# Patient Record
Sex: FEMALE | Race: WHITE | Employment: UNEMPLOYED | URBAN - METROPOLITAN AREA
[De-identification: names, ages, dates, MRNs, and addresses within clinical notes are randomized per-mention and may not be internally consistent; named-entity substitution may affect disease eponyms.]

---

## 2017-06-06 ENCOUNTER — OFFICE VISIT (OUTPATIENT)
Dept: INTERNAL MEDICINE | Age: 62
End: 2017-06-06

## 2017-06-06 VITALS
HEART RATE: 66 BPM | OXYGEN SATURATION: 98 % | DIASTOLIC BLOOD PRESSURE: 70 MMHG | BODY MASS INDEX: 27 KG/M2 | WEIGHT: 168 LBS | HEIGHT: 66 IN | SYSTOLIC BLOOD PRESSURE: 124 MMHG

## 2017-06-06 DIAGNOSIS — E55.9 VITAMIN D DEFICIENCY: Chronic | ICD-10-CM

## 2017-06-06 DIAGNOSIS — E78.5 HYPERLIPIDEMIA, UNSPECIFIED HYPERLIPIDEMIA TYPE: Chronic | ICD-10-CM

## 2017-06-06 DIAGNOSIS — K21.9 GASTROESOPHAGEAL REFLUX DISEASE WITHOUT ESOPHAGITIS: Chronic | ICD-10-CM

## 2017-06-06 DIAGNOSIS — I10 ESSENTIAL HYPERTENSION: Primary | Chronic | ICD-10-CM

## 2017-06-06 DIAGNOSIS — F33.42 RECURRENT MAJOR DEPRESSIVE DISORDER, IN FULL REMISSION (HCC): Chronic | ICD-10-CM

## 2017-06-06 DIAGNOSIS — E03.9 HYPOTHYROIDISM, UNSPECIFIED TYPE: Chronic | ICD-10-CM

## 2017-06-06 DIAGNOSIS — F41.9 ANXIETY: Chronic | ICD-10-CM

## 2017-06-06 PROBLEM — N83.202 LEFT OVARIAN CYST: Chronic | Status: ACTIVE | Noted: 2017-06-06

## 2017-06-06 PROBLEM — N83.202 LEFT OVARIAN CYST: Status: ACTIVE | Noted: 2017-06-06

## 2017-06-06 PROCEDURE — G8427 DOCREV CUR MEDS BY ELIG CLIN: HCPCS | Performed by: INTERNAL MEDICINE

## 2017-06-06 PROCEDURE — 3014F SCREEN MAMMO DOC REV: CPT | Performed by: INTERNAL MEDICINE

## 2017-06-06 PROCEDURE — 99204 OFFICE O/P NEW MOD 45 MIN: CPT | Performed by: INTERNAL MEDICINE

## 2017-06-06 PROCEDURE — 3017F COLORECTAL CA SCREEN DOC REV: CPT | Performed by: INTERNAL MEDICINE

## 2017-06-06 PROCEDURE — 1036F TOBACCO NON-USER: CPT | Performed by: INTERNAL MEDICINE

## 2017-06-06 PROCEDURE — G8419 CALC BMI OUT NRM PARAM NOF/U: HCPCS | Performed by: INTERNAL MEDICINE

## 2017-06-06 RX ORDER — LEVOTHYROXINE SODIUM 0.15 MG/1
150 TABLET ORAL DAILY
COMMUNITY
Start: 2017-06-06 | End: 2017-06-06 | Stop reason: SDUPTHER

## 2017-06-06 RX ORDER — METOPROLOL TARTRATE 50 MG/1
50 TABLET, FILM COATED ORAL 2 TIMES DAILY
COMMUNITY
Start: 2017-06-06 | End: 2017-06-06 | Stop reason: SDUPTHER

## 2017-06-06 RX ORDER — LEVOTHYROXINE SODIUM 0.15 MG/1
150 TABLET ORAL DAILY
Qty: 30 TABLET | Refills: 12 | Status: SHIPPED | OUTPATIENT
Start: 2017-06-06 | End: 2017-09-06 | Stop reason: DRUGHIGH

## 2017-06-06 RX ORDER — SERTRALINE HYDROCHLORIDE 100 MG/1
100 TABLET, FILM COATED ORAL DAILY
COMMUNITY
Start: 2017-06-06 | End: 2017-06-06 | Stop reason: SDUPTHER

## 2017-06-06 RX ORDER — CLONAZEPAM 0.5 MG/1
0.5 TABLET ORAL 3 TIMES DAILY
Qty: 90 TABLET | Refills: 2 | Status: SHIPPED | OUTPATIENT
Start: 2017-06-06 | End: 2017-08-29 | Stop reason: SDUPTHER

## 2017-06-06 RX ORDER — METOPROLOL TARTRATE 50 MG/1
50 TABLET, FILM COATED ORAL 2 TIMES DAILY
Qty: 180 TABLET | Refills: 3 | Status: SHIPPED | OUTPATIENT
Start: 2017-06-06

## 2017-06-06 RX ORDER — CLONAZEPAM 0.5 MG/1
0.5 TABLET ORAL 3 TIMES DAILY
COMMUNITY
Start: 2017-06-06 | End: 2017-06-06 | Stop reason: SDUPTHER

## 2017-06-06 RX ORDER — SERTRALINE HYDROCHLORIDE 100 MG/1
100 TABLET, FILM COATED ORAL DAILY
Qty: 90 TABLET | Refills: 3 | Status: SHIPPED | OUTPATIENT
Start: 2017-06-06 | End: 2017-07-19 | Stop reason: SDUPTHER

## 2017-06-06 ASSESSMENT — ENCOUNTER SYMPTOMS
RESPIRATORY NEGATIVE: 1
EYES NEGATIVE: 1
GASTROINTESTINAL NEGATIVE: 1

## 2017-06-07 DIAGNOSIS — F41.9 ANXIETY: Chronic | ICD-10-CM

## 2017-06-07 LAB
ALBUMIN SERPL-MCNC: 4.5 G/DL (ref 3.4–5)
ALP BLD-CCNC: 115 U/L (ref 40–129)
ALT SERPL-CCNC: 12 U/L (ref 10–40)
ANION GAP SERPL CALCULATED.3IONS-SCNC: 16 MMOL/L (ref 3–16)
AST SERPL-CCNC: 12 U/L (ref 15–37)
BASOPHILS ABSOLUTE: 0.1 K/UL (ref 0–0.2)
BASOPHILS RELATIVE PERCENT: 1.1 %
BILIRUB SERPL-MCNC: <0.2 MG/DL (ref 0–1)
BILIRUBIN DIRECT: <0.2 MG/DL (ref 0–0.3)
BILIRUBIN URINE: NEGATIVE
BILIRUBIN, INDIRECT: ABNORMAL MG/DL (ref 0–1)
BLOOD, URINE: NEGATIVE
BUN BLDV-MCNC: 15 MG/DL (ref 7–20)
CALCIUM SERPL-MCNC: 9.6 MG/DL (ref 8.3–10.6)
CASTS: ABNORMAL /LPF
CHLORIDE BLD-SCNC: 101 MMOL/L (ref 99–110)
CHOLESTEROL, TOTAL: 237 MG/DL (ref 0–199)
CLARITY: CLEAR
CO2: 26 MMOL/L (ref 21–32)
COLOR: YELLOW
CREAT SERPL-MCNC: 0.7 MG/DL (ref 0.6–1.2)
EOSINOPHILS ABSOLUTE: 0.3 K/UL (ref 0–0.6)
EOSINOPHILS RELATIVE PERCENT: 3.7 %
EPITHELIAL CELLS, UA: 4 /HPF (ref 0–5)
GFR AFRICAN AMERICAN: >60
GFR NON-AFRICAN AMERICAN: >60
GLUCOSE BLD-MCNC: 90 MG/DL (ref 70–99)
GLUCOSE URINE: NEGATIVE MG/DL
HCT VFR BLD CALC: 47.2 % (ref 36–48)
HDLC SERPL-MCNC: 25 MG/DL (ref 40–60)
HEMOGLOBIN: 15.2 G/DL (ref 12–16)
KETONES, URINE: NEGATIVE MG/DL
LDL CHOLESTEROL CALCULATED: ABNORMAL MG/DL
LDL CHOLESTEROL DIRECT: 159 MG/DL
LEUKOCYTE ESTERASE, URINE: ABNORMAL
LYMPHOCYTES ABSOLUTE: 2.5 K/UL (ref 1–5.1)
LYMPHOCYTES RELATIVE PERCENT: 35.5 %
MCH RBC QN AUTO: 28.7 PG (ref 26–34)
MCHC RBC AUTO-ENTMCNC: 32.2 G/DL (ref 31–36)
MCV RBC AUTO: 89 FL (ref 80–100)
MICROSCOPIC EXAMINATION: YES
MONOCYTES ABSOLUTE: 0.4 K/UL (ref 0–1.3)
MONOCYTES RELATIVE PERCENT: 6.4 %
NEUTROPHILS ABSOLUTE: 3.7 K/UL (ref 1.7–7.7)
NEUTROPHILS RELATIVE PERCENT: 53.3 %
NITRITE, URINE: NEGATIVE
PDW BLD-RTO: 13.4 % (ref 12.4–15.4)
PH UA: 6
PHOSPHORUS: 3.8 MG/DL (ref 2.5–4.9)
PLATELET # BLD: 312 K/UL (ref 135–450)
PMV BLD AUTO: 8.8 FL (ref 5–10.5)
POTASSIUM SERPL-SCNC: 4.9 MMOL/L (ref 3.5–5.1)
PROTEIN UA: NEGATIVE MG/DL
RBC # BLD: 5.31 M/UL (ref 4–5.2)
RBC UA: 4 /HPF (ref 0–4)
SODIUM BLD-SCNC: 143 MMOL/L (ref 136–145)
SPECIFIC GRAVITY UA: 1.02
TOTAL PROTEIN: 6.8 G/DL (ref 6.4–8.2)
TRIGL SERPL-MCNC: 341 MG/DL (ref 0–150)
TSH SERPL DL<=0.05 MIU/L-ACNC: 0.06 UIU/ML (ref 0.27–4.2)
URINE TYPE: ABNORMAL
UROBILINOGEN, URINE: 0.2 E.U./DL
VITAMIN D 25-HYDROXY: 19.9 NG/ML
VLDLC SERPL CALC-MCNC: ABNORMAL MG/DL
WBC # BLD: 7 K/UL (ref 4–11)
WBC UA: 1 /HPF (ref 0–5)

## 2017-06-11 LAB
6-ACETYLMORPHINE: NOT DETECTED
7-AMINOCLONAZEPAM: PRESENT
ALPHA-OH-ALPRAZOLAM: NOT DETECTED
ALPRAZOLAM: NOT DETECTED
AMPHETAMINE: NOT DETECTED
BARBITURATES: NOT DETECTED
BENZOYLECGONINE: NOT DETECTED
BUPRENORPHINE: NOT DETECTED
CARISOPRODOL: NOT DETECTED
CLONAZEPAM: NOT DETECTED
CODEINE: NOT DETECTED
CREATININE URINE: 87.1 MG/DL (ref 20–400)
DIAZEPAM: NOT DETECTED
DRUGS EXPECTED: NORMAL
EER PAIN MGT DRUG PANEL, HIGH RES/EMIT U: NORMAL
ETHYL GLUCURONIDE: NOT DETECTED
FENTANYL: NOT DETECTED
HYDROCODONE: NOT DETECTED
HYDROMORPHONE: NOT DETECTED
LORAZEPAM: NOT DETECTED
MARIJUANA METABOLITE: PRESENT
MDA: NOT DETECTED
MDEA: NOT DETECTED
MDMA URINE: NOT DETECTED
MEPERIDINE: NOT DETECTED
METHADONE: NOT DETECTED
METHAMPHETAMINE: NOT DETECTED
METHYLPHENIDATE: NOT DETECTED
MIDAZOLAM: NOT DETECTED
MORPHINE: NOT DETECTED
NORBUPRENORPHINE, FREE: NOT DETECTED
NORDIAZEPAM: NOT DETECTED
NORFENTANYL: NOT DETECTED
NORHYDROCODONE, URINE: NOT DETECTED
NOROXYCODONE: NOT DETECTED
NOROXYMORPHONE, URINE: NOT DETECTED
OXAZEPAM: NOT DETECTED
OXYCODONE: NOT DETECTED
OXYMORPHONE: NOT DETECTED
PAIN MANAGEMENT DRUG PANEL: NORMAL
PAIN MANAGEMENT DRUG PANEL: NORMAL
PCP: NOT DETECTED
PHENTERMINE: NOT DETECTED
PROPOXYPHENE: NOT DETECTED
TAPENTADOL, URINE: NOT DETECTED
TAPENTADOL-O-SULFATE, URINE: NOT DETECTED
TEMAZEPAM: NOT DETECTED
TRAMADOL: NOT DETECTED
ZOLPIDEM: NOT DETECTED

## 2017-06-11 ASSESSMENT — ENCOUNTER SYMPTOMS
BLURRED VISION: 0
ORTHOPNEA: 0
SHORTNESS OF BREATH: 0

## 2017-07-18 ENCOUNTER — TELEPHONE (OUTPATIENT)
Dept: INTERNAL MEDICINE | Age: 62
End: 2017-07-18

## 2017-07-18 DIAGNOSIS — F33.42 RECURRENT MAJOR DEPRESSIVE DISORDER, IN FULL REMISSION (HCC): Chronic | ICD-10-CM

## 2017-07-19 RX ORDER — SERTRALINE HYDROCHLORIDE 100 MG/1
TABLET, FILM COATED ORAL
Qty: 135 TABLET | Refills: 3 | Status: SHIPPED | OUTPATIENT
Start: 2017-07-19 | End: 2017-09-06 | Stop reason: SDUPTHER

## 2017-08-29 DIAGNOSIS — F41.9 ANXIETY: Chronic | ICD-10-CM

## 2017-08-30 ENCOUNTER — TELEPHONE (OUTPATIENT)
Dept: INTERNAL MEDICINE | Age: 62
End: 2017-08-30

## 2017-08-30 RX ORDER — CLONAZEPAM 0.5 MG/1
TABLET ORAL
Qty: 90 TABLET | Refills: 2 | Status: SHIPPED | OUTPATIENT
Start: 2017-08-30 | End: 2017-09-06 | Stop reason: DRUGHIGH

## 2017-09-06 ENCOUNTER — OFFICE VISIT (OUTPATIENT)
Dept: INTERNAL MEDICINE | Age: 62
End: 2017-09-06

## 2017-09-06 VITALS
OXYGEN SATURATION: 98 % | SYSTOLIC BLOOD PRESSURE: 120 MMHG | WEIGHT: 164 LBS | DIASTOLIC BLOOD PRESSURE: 80 MMHG | HEIGHT: 66 IN | HEART RATE: 55 BPM | BODY MASS INDEX: 26.36 KG/M2

## 2017-09-06 DIAGNOSIS — Z12.12 SCREENING FOR COLORECTAL CANCER: ICD-10-CM

## 2017-09-06 DIAGNOSIS — Z12.11 SCREENING FOR COLORECTAL CANCER: ICD-10-CM

## 2017-09-06 DIAGNOSIS — Z23 NEED FOR TDAP VACCINATION: ICD-10-CM

## 2017-09-06 DIAGNOSIS — F41.9 ANXIETY: Chronic | ICD-10-CM

## 2017-09-06 DIAGNOSIS — E55.9 VITAMIN D DEFICIENCY: Chronic | ICD-10-CM

## 2017-09-06 DIAGNOSIS — F33.42 RECURRENT MAJOR DEPRESSIVE DISORDER, IN FULL REMISSION (HCC): Primary | Chronic | ICD-10-CM

## 2017-09-06 DIAGNOSIS — E03.9 HYPOTHYROIDISM, UNSPECIFIED TYPE: Chronic | ICD-10-CM

## 2017-09-06 DIAGNOSIS — E78.5 HYPERLIPIDEMIA, UNSPECIFIED HYPERLIPIDEMIA TYPE: Chronic | ICD-10-CM

## 2017-09-06 DIAGNOSIS — I10 ESSENTIAL HYPERTENSION: Chronic | ICD-10-CM

## 2017-09-06 PROCEDURE — 99214 OFFICE O/P EST MOD 30 MIN: CPT | Performed by: INTERNAL MEDICINE

## 2017-09-06 PROCEDURE — 3014F SCREEN MAMMO DOC REV: CPT | Performed by: INTERNAL MEDICINE

## 2017-09-06 PROCEDURE — 1036F TOBACCO NON-USER: CPT | Performed by: INTERNAL MEDICINE

## 2017-09-06 PROCEDURE — G8417 CALC BMI ABV UP PARAM F/U: HCPCS | Performed by: INTERNAL MEDICINE

## 2017-09-06 PROCEDURE — 90715 TDAP VACCINE 7 YRS/> IM: CPT | Performed by: INTERNAL MEDICINE

## 2017-09-06 PROCEDURE — 90471 IMMUNIZATION ADMIN: CPT | Performed by: INTERNAL MEDICINE

## 2017-09-06 PROCEDURE — G8427 DOCREV CUR MEDS BY ELIG CLIN: HCPCS | Performed by: INTERNAL MEDICINE

## 2017-09-06 PROCEDURE — 3017F COLORECTAL CA SCREEN DOC REV: CPT | Performed by: INTERNAL MEDICINE

## 2017-09-06 RX ORDER — CLONAZEPAM 1 MG/1
1 TABLET ORAL 2 TIMES DAILY
Qty: 60 TABLET | Refills: 2 | Status: ON HOLD | OUTPATIENT
Start: 2017-09-06 | End: 2021-12-29 | Stop reason: SDUPTHER

## 2017-09-06 RX ORDER — LEVOTHYROXINE SODIUM 137 UG/1
137 TABLET ORAL DAILY
Qty: 90 TABLET | Refills: 3 | Status: SHIPPED | OUTPATIENT
Start: 2017-09-06

## 2017-09-06 RX ORDER — MULTIVIT-MIN/IRON/FOLIC ACID/K 18-600-40
1 CAPSULE ORAL DAILY
Qty: 90 CAPSULE | Refills: 3 | Status: SHIPPED | OUTPATIENT
Start: 2017-09-06

## 2017-09-06 RX ORDER — SIMVASTATIN 40 MG
40 TABLET ORAL NIGHTLY
Qty: 90 TABLET | Refills: 3 | Status: ON HOLD | OUTPATIENT
Start: 2017-09-06 | End: 2021-12-22 | Stop reason: CLARIF

## 2017-09-06 RX ORDER — SERTRALINE HYDROCHLORIDE 100 MG/1
TABLET, FILM COATED ORAL
Qty: 135 TABLET | Refills: 3 | Status: ON HOLD | OUTPATIENT
Start: 2017-09-06 | End: 2021-12-22 | Stop reason: CLARIF

## 2017-09-06 ASSESSMENT — ENCOUNTER SYMPTOMS
EYES NEGATIVE: 1
BLURRED VISION: 0
ORTHOPNEA: 0
GASTROINTESTINAL NEGATIVE: 1
RESPIRATORY NEGATIVE: 1
SHORTNESS OF BREATH: 0

## 2017-09-06 ASSESSMENT — PATIENT HEALTH QUESTIONNAIRE - PHQ9
SUM OF ALL RESPONSES TO PHQ QUESTIONS 1-9: 0
SUM OF ALL RESPONSES TO PHQ9 QUESTIONS 1 & 2: 0
1. LITTLE INTEREST OR PLEASURE IN DOING THINGS: 0
2. FEELING DOWN, DEPRESSED OR HOPELESS: 0

## 2017-12-07 ENCOUNTER — OFFICE VISIT (OUTPATIENT)
Dept: INTERNAL MEDICINE CLINIC | Age: 62
End: 2017-12-07

## 2017-12-07 VITALS
HEART RATE: 80 BPM | HEIGHT: 66 IN | WEIGHT: 170 LBS | DIASTOLIC BLOOD PRESSURE: 94 MMHG | SYSTOLIC BLOOD PRESSURE: 142 MMHG | BODY MASS INDEX: 27.32 KG/M2 | RESPIRATION RATE: 16 BRPM

## 2017-12-07 DIAGNOSIS — F32.A ANXIETY AND DEPRESSION: Primary | ICD-10-CM

## 2017-12-07 DIAGNOSIS — F41.9 ANXIETY AND DEPRESSION: Primary | ICD-10-CM

## 2017-12-07 PROCEDURE — 3017F COLORECTAL CA SCREEN DOC REV: CPT | Performed by: INTERNAL MEDICINE

## 2017-12-07 PROCEDURE — 1036F TOBACCO NON-USER: CPT | Performed by: INTERNAL MEDICINE

## 2017-12-07 PROCEDURE — G8427 DOCREV CUR MEDS BY ELIG CLIN: HCPCS | Performed by: INTERNAL MEDICINE

## 2017-12-07 PROCEDURE — 3014F SCREEN MAMMO DOC REV: CPT | Performed by: INTERNAL MEDICINE

## 2017-12-07 PROCEDURE — G8484 FLU IMMUNIZE NO ADMIN: HCPCS | Performed by: INTERNAL MEDICINE

## 2017-12-07 PROCEDURE — G8417 CALC BMI ABV UP PARAM F/U: HCPCS | Performed by: INTERNAL MEDICINE

## 2017-12-07 PROCEDURE — 99212 OFFICE O/P EST SF 10 MIN: CPT | Performed by: INTERNAL MEDICINE

## 2017-12-07 NOTE — PROGRESS NOTES
Cb Duncan  YOB: 1955    Date of Service:  12/7/2017    Chief Complaint:   Cb Duncan is a 65 y.o. female who presents for   Chief Complaint   Patient presents with   Guillermina Stafford       HPI: Here to [facility] and Follow up. Pt arrived after her appointment and when I saw her and try to talk to her about her psychiatric medication and to discuss with her getting another opinion regarding her klonopin or weaning it down a little, she decided she wanted to see a different PCP and end this visit. When asked about her Bipolar dx on file, she denies having bipolar. She's seen a psychiatrist a long time ago and does not want to see one again.       Lab Results   Component Value Date    LABMICR YES 06/07/2017     Lab Results   Component Value Date     06/07/2017    K 4.9 06/07/2017     06/07/2017    CO2 26 06/07/2017    BUN 15 06/07/2017    CREATININE 0.7 06/07/2017    GLUCOSE 90 06/07/2017    CALCIUM 9.6 06/07/2017     Lab Results   Component Value Date    CHOL 237 06/07/2017    TRIG 341 06/07/2017    HDL 25 06/07/2017    HDL 35 05/04/2011    LDLCALC see below 06/07/2017    LDLDIRECT 159 06/07/2017     Lab Results   Component Value Date    ALT 12 06/07/2017    AST 12 (L) 06/07/2017     Lab Results   Component Value Date    TSH 0.06 (L) 06/07/2017    TSH 5.25 05/04/2011     Lab Results   Component Value Date    WBC 7.0 06/07/2017    HGB 15.2 06/07/2017    HCT 47.2 06/07/2017    MCV 89.0 06/07/2017     06/07/2017     No results found for: INR   No results found for: PSA   No results found for: LABURIC     Wt Readings from Last 3 Encounters:   12/07/17 170 lb (77.1 kg)   09/06/17 164 lb (74.4 kg)   06/06/17 168 lb (76.2 kg)     BP Readings from Last 3 Encounters:   12/07/17 (!) 142/94   09/06/17 120/80   06/06/17 124/70       Patient Active Problem List   Diagnosis    Hyperlipidemia    Vitamin D deficiency    Bipolar affective disorder (HCC)    Anxiety  Hypothyroidism    Essential hypertension    Gastroesophageal reflux disease without esophagitis    Recurrent major depressive disorder, in full remission ([address]    Left ovarian cyst       No Known Allergies  Outpatient Prescriptions Marked as Taking for the 12/7/17 encounter (Office Visit) with Ariana Gates MD   Medication Sig Dispense Refill    sertraline (ZOLOFT) 100 MG tablet Take 1.5 tablets by mouth daily 135 tablet 3    simvastatin (ZOCOR) 40 MG tablet Take 1 tablet by mouth nightly 90 tablet 3    levothyroxine (SYNTHROID) 137 MCG tablet Take 1 tablet by mouth daily 90 tablet 3    Cholecalciferol (VITAMIN D) 2000 units CAPS capsule Take 1 capsule by mouth daily 90 capsule 3    clonazePAM (KLONOPIN) 1 MG tablet Take 1 tablet by mouth 2 times daily Do not fill until September 28, 2017 60 tablet 2    metoprolol tartrate (LOPRESSOR) 50 MG tablet Take 1 tablet by mouth 2 times daily 180 tablet 3       Past Medical History:   Diagnosis Date    Anxiety 6/2/2011    Bipolar affective disorder ([address] 6/23/2010    Diaphragmatic hernia without mention of obstruction or gangrene     Essential hypertension 6/6/2017    Gastroesophageal reflux disease without esophagitis 6/6/2017    Hyperlipidemia 5/26/2010    Hypothyroidism 6/6/2017    Left ovarian cyst 6/6/2017    Recurrent major depressive disorder, in full remission ([address] 6/6/2017    Vitamin D deficiency 5/26/2010     Past Surgical History:   Procedure Laterality Date    COLONOSCOPY  2006    HYSTERECTOMY  06/2000    Dr. Violet Robles - vaginal hysterectomy with anterior and posterior repair for a symptomatic second-degree uterovaginal prolapse complicated by a same-day post-operative intraperitoneal hemorrhage     Family History   Problem Relation Age of Onset    Schizophrenia Mother     Cancer Mother      lung cancer    Lung Cancer Mother     Substance Abuse Mother      smoker    Mental Illness Daughter     Emphysema Father     Substance Abuse Father      smoker    COPD Father     High Blood Pressure Father     Hypertension Father     High Blood Pressure Brother     Substance Abuse Brother      smoker    Other Brother      aortic disection (repaired)   [facility] Hypertension Brother     Thyroid Disease Sister      untreated hypothyroidism    Substance Abuse Sister      smoker    High Blood Pressure Brother     Hypertension Brother     Cancer Brother      basal cell carcinoma of the skin    Immunodeficiency Son      HIV     Mental Illness Daughter      Social History     Social History    Marital status:      Spouse name: N/A    Number of children: 3    Years of education: N/A     Occupational History    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      as of June 6, 2017    receiving disability since 1997 (she says that she was wrongfully discharged)      as of June 6, 2017     Social History Main Topics    Smoking status: Former Smoker    Smokeless tobacco: Never Used      Comment: started to smoke in 1992 - quit smoking in 1997 or 1998    Alcohol use No    Drug use: No    Sexual activity: Not Currently     Partners: Male     Other Topics Concern    Not on file     Social History Narrative    No narrative on file       Review of Systems:       Physical Exam:   Vitals:    12/07/17 1452   BP: (!) 142/94   Pulse: 80   Resp: 16   Weight: 170 lb (77.1 kg)   Height: 5' 6\" (1.676 m)     Body mass index is 27.44 kg/m². Assessment/Plan:    Mike Dove was seen today for established new doctor. Diagnoses and all orders for this visit:    Anxiety and depression    Other orders  -     Cancel: INFLUENZA, QUADV, 3 YRS AND OLDER, IM, PF, PREFILL SYR OR SDV, 0.5ML (FLUZONE QUADV, PF)        Return Establish with new PCP.

## 2017-12-08 ENCOUNTER — TELEPHONE (OUTPATIENT)
Dept: INTERNAL MEDICINE | Age: 62
End: 2017-12-08

## 2017-12-08 DIAGNOSIS — F41.9 ANXIETY: Chronic | ICD-10-CM

## 2017-12-08 RX ORDER — CLONAZEPAM 1 MG/1
1 TABLET ORAL 2 TIMES DAILY
Qty: 60 TABLET | Refills: 0 | Status: CANCELLED | OUTPATIENT
Start: 2017-12-08

## 2017-12-08 NOTE — TELEPHONE ENCOUNTER
Pt informed that medication was pended for Dr Tyesha Garcia to review   Pt was very grateful for pending refill   Pt can be reached at [phone]

## 2021-12-22 ENCOUNTER — HOSPITAL ENCOUNTER (EMERGENCY)
Age: 66
Discharge: PSYCHIATRIC OR SUBSTANCE USE FACILITY | End: 2021-12-22
Attending: EMERGENCY MEDICINE
Payer: MEDICARE

## 2021-12-22 ENCOUNTER — HOSPITAL ENCOUNTER (INPATIENT)
Age: 66
LOS: 4 days | Discharge: ANOTHER HOSPITAL | DRG: 885 | End: 2021-12-26
Attending: PSYCHIATRY & NEUROLOGY | Admitting: PSYCHIATRY & NEUROLOGY
Payer: MEDICARE

## 2021-12-22 ENCOUNTER — APPOINTMENT (OUTPATIENT)
Dept: GENERAL RADIOLOGY | Age: 66
End: 2021-12-22
Payer: MEDICARE

## 2021-12-22 VITALS
OXYGEN SATURATION: 97 % | SYSTOLIC BLOOD PRESSURE: 125 MMHG | HEART RATE: 75 BPM | TEMPERATURE: 97.8 F | DIASTOLIC BLOOD PRESSURE: 90 MMHG | RESPIRATION RATE: 18 BRPM

## 2021-12-22 DIAGNOSIS — F31.10 BIPOLAR AFFECTIVE DISORDER, CURRENT EPISODE MANIC, CURRENT EPISODE SEVERITY UNSPECIFIED (HCC): Primary | ICD-10-CM

## 2021-12-22 PROBLEM — F29 PSYCHOTIC DISORDER WITH DELUSIONS (HCC): Status: ACTIVE | Noted: 2021-12-22

## 2021-12-22 PROBLEM — F41.1 GAD (GENERALIZED ANXIETY DISORDER): Status: ACTIVE | Noted: 2018-02-13

## 2021-12-22 PROBLEM — F31.9 BIPOLAR 1 DISORDER (HCC): Status: ACTIVE | Noted: 2021-12-22

## 2021-12-22 PROBLEM — Z79.899 CHRONIC PRESCRIPTION BENZODIAZEPINE USE: Chronic | Status: ACTIVE | Noted: 2021-12-22

## 2021-12-22 PROBLEM — E03.4 HYPOTHYROIDISM DUE TO ACQUIRED ATROPHY OF THYROID: Status: ACTIVE | Noted: 2018-02-13

## 2021-12-22 LAB
ACETAMINOPHEN LEVEL: <5 UG/ML (ref 10–30)
AMPHETAMINE SCREEN, URINE: ABNORMAL
ANION GAP SERPL CALCULATED.3IONS-SCNC: 16 MMOL/L (ref 3–16)
BACTERIA: ABNORMAL /HPF
BARBITURATE SCREEN URINE: ABNORMAL
BASE EXCESS VENOUS: -1.2 MMOL/L (ref -2–3)
BASOPHILS ABSOLUTE: 0.2 K/UL (ref 0–0.2)
BASOPHILS RELATIVE PERCENT: 2.3 %
BENZODIAZEPINE SCREEN, URINE: ABNORMAL
BILIRUBIN URINE: ABNORMAL
BLOOD, URINE: ABNORMAL
BUN BLDV-MCNC: 21 MG/DL (ref 7–20)
CALCIUM SERPL-MCNC: 9.2 MG/DL (ref 8.3–10.6)
CANNABINOID SCREEN URINE: POSITIVE
CARBOXYHEMOGLOBIN: 0.6 % (ref 0–1.5)
CHLORIDE BLD-SCNC: 105 MMOL/L (ref 99–110)
CLARITY: CLEAR
CO2: 19 MMOL/L (ref 21–32)
COCAINE METABOLITE SCREEN URINE: ABNORMAL
COLOR: YELLOW
CREAT SERPL-MCNC: 0.7 MG/DL (ref 0.6–1.2)
EKG ATRIAL RATE: 73 BPM
EKG DIAGNOSIS: NORMAL
EKG P AXIS: 61 DEGREES
EKG P-R INTERVAL: 178 MS
EKG Q-T INTERVAL: 444 MS
EKG QRS DURATION: 94 MS
EKG QTC CALCULATION (BAZETT): 489 MS
EKG R AXIS: -35 DEGREES
EKG T AXIS: 33 DEGREES
EKG VENTRICULAR RATE: 73 BPM
EOSINOPHILS ABSOLUTE: 0.1 K/UL (ref 0–0.6)
EOSINOPHILS RELATIVE PERCENT: 1 %
ETHANOL: NORMAL MG/DL (ref 0–0.08)
GFR AFRICAN AMERICAN: >60
GFR NON-AFRICAN AMERICAN: >60
GLUCOSE BLD-MCNC: 108 MG/DL (ref 70–99)
GLUCOSE URINE: NEGATIVE MG/DL
HCO3 VENOUS: 19.9 MMOL/L (ref 24–28)
HCT VFR BLD CALC: 44.9 % (ref 36–48)
HEMOGLOBIN, VEN, REDUCED: 4.3 %
HEMOGLOBIN: 15.4 G/DL (ref 12–16)
KETONES, URINE: 15 MG/DL
LEUKOCYTE ESTERASE, URINE: NEGATIVE
LYMPHOCYTES ABSOLUTE: 2.6 K/UL (ref 1–5.1)
LYMPHOCYTES RELATIVE PERCENT: 28.3 %
Lab: ABNORMAL
MCH RBC QN AUTO: 30.1 PG (ref 26–34)
MCHC RBC AUTO-ENTMCNC: 34.2 G/DL (ref 31–36)
MCV RBC AUTO: 88 FL (ref 80–100)
METHADONE SCREEN, URINE: ABNORMAL
METHEMOGLOBIN VENOUS: 0.1 % (ref 0–1.5)
MICROSCOPIC EXAMINATION: YES
MONOCYTES ABSOLUTE: 0.6 K/UL (ref 0–1.3)
MONOCYTES RELATIVE PERCENT: 6.5 %
NEUTROPHILS ABSOLUTE: 5.8 K/UL (ref 1.7–7.7)
NEUTROPHILS RELATIVE PERCENT: 61.9 %
NITRITE, URINE: NEGATIVE
O2 SAT, VEN: 96 %
OPIATE SCREEN URINE: ABNORMAL
OXYCODONE URINE: ABNORMAL
PCO2, VEN: 24.7 MMHG (ref 41–51)
PDW BLD-RTO: 13.5 % (ref 12.4–15.4)
PH UA: 5.5
PH UA: 5.5 (ref 5–8)
PH VENOUS: 7.51 (ref 7.35–7.45)
PHENCYCLIDINE SCREEN URINE: ABNORMAL
PLATELET # BLD: 295 K/UL (ref 135–450)
PMV BLD AUTO: 8.3 FL (ref 5–10.5)
PO2, VEN: 62.6 MMHG (ref 25–40)
POTASSIUM SERPL-SCNC: 3.2 MMOL/L (ref 3.5–5.1)
PRO-BNP: 162 PG/ML (ref 0–124)
PROPOXYPHENE SCREEN: ABNORMAL
PROTEIN UA: ABNORMAL MG/DL
RBC # BLD: 5.11 M/UL (ref 4–5.2)
RBC UA: ABNORMAL /HPF (ref 0–4)
SALICYLATE, SERUM: <0.3 MG/DL (ref 15–30)
SARS-COV-2, NAAT: NOT DETECTED
SODIUM BLD-SCNC: 140 MMOL/L (ref 136–145)
SPECIFIC GRAVITY UA: >=1.03 (ref 1–1.03)
TCO2 CALC VENOUS: 21 MMOL/L
TROPONIN: <0.01 NG/ML
TSH SERPL DL<=0.05 MIU/L-ACNC: 32.52 UIU/ML (ref 0.27–4.2)
URINE TYPE: ABNORMAL
UROBILINOGEN, URINE: 0.2 E.U./DL
WBC # BLD: 9.3 K/UL (ref 4–11)
WBC UA: ABNORMAL /HPF (ref 0–5)

## 2021-12-22 PROCEDURE — 80061 LIPID PANEL: CPT

## 2021-12-22 PROCEDURE — 80048 BASIC METABOLIC PNL TOTAL CA: CPT

## 2021-12-22 PROCEDURE — 71045 X-RAY EXAM CHEST 1 VIEW: CPT

## 2021-12-22 PROCEDURE — 81001 URINALYSIS AUTO W/SCOPE: CPT

## 2021-12-22 PROCEDURE — 83036 HEMOGLOBIN GLYCOSYLATED A1C: CPT

## 2021-12-22 PROCEDURE — 96372 THER/PROPH/DIAG INJ SC/IM: CPT

## 2021-12-22 PROCEDURE — 96374 THER/PROPH/DIAG INJ IV PUSH: CPT

## 2021-12-22 PROCEDURE — 36415 COLL VENOUS BLD VENIPUNCTURE: CPT

## 2021-12-22 PROCEDURE — 99221 1ST HOSP IP/OBS SF/LOW 40: CPT

## 2021-12-22 PROCEDURE — 6360000002 HC RX W HCPCS: Performed by: EMERGENCY MEDICINE

## 2021-12-22 PROCEDURE — 80179 DRUG ASSAY SALICYLATE: CPT

## 2021-12-22 PROCEDURE — 99223 1ST HOSP IP/OBS HIGH 75: CPT | Performed by: PSYCHIATRY & NEUROLOGY

## 2021-12-22 PROCEDURE — 84484 ASSAY OF TROPONIN QUANT: CPT

## 2021-12-22 PROCEDURE — 82803 BLOOD GASES ANY COMBINATION: CPT

## 2021-12-22 PROCEDURE — 6370000000 HC RX 637 (ALT 250 FOR IP): Performed by: EMERGENCY MEDICINE

## 2021-12-22 PROCEDURE — 99285 EMERGENCY DEPT VISIT HI MDM: CPT

## 2021-12-22 PROCEDURE — 83880 ASSAY OF NATRIURETIC PEPTIDE: CPT

## 2021-12-22 PROCEDURE — 80143 DRUG ASSAY ACETAMINOPHEN: CPT

## 2021-12-22 PROCEDURE — 85025 COMPLETE CBC W/AUTO DIFF WBC: CPT

## 2021-12-22 PROCEDURE — 80307 DRUG TEST PRSMV CHEM ANLYZR: CPT

## 2021-12-22 PROCEDURE — 82077 ASSAY SPEC XCP UR&BREATH IA: CPT

## 2021-12-22 PROCEDURE — 1240000000 HC EMOTIONAL WELLNESS R&B

## 2021-12-22 PROCEDURE — 6370000000 HC RX 637 (ALT 250 FOR IP)

## 2021-12-22 PROCEDURE — 6370000000 HC RX 637 (ALT 250 FOR IP): Performed by: PSYCHIATRY & NEUROLOGY

## 2021-12-22 PROCEDURE — 87635 SARS-COV-2 COVID-19 AMP PRB: CPT

## 2021-12-22 PROCEDURE — 84443 ASSAY THYROID STIM HORMONE: CPT

## 2021-12-22 PROCEDURE — 93005 ELECTROCARDIOGRAM TRACING: CPT | Performed by: EMERGENCY MEDICINE

## 2021-12-22 RX ORDER — SERTRALINE HYDROCHLORIDE 100 MG/1
100 TABLET, FILM COATED ORAL DAILY
Status: DISCONTINUED | OUTPATIENT
Start: 2021-12-22 | End: 2021-12-22

## 2021-12-22 RX ORDER — ACETAMINOPHEN 325 MG/1
650 TABLET ORAL EVERY 4 HOURS PRN
Status: DISCONTINUED | OUTPATIENT
Start: 2021-12-22 | End: 2021-12-26 | Stop reason: HOSPADM

## 2021-12-22 RX ORDER — MAGNESIUM HYDROXIDE/ALUMINUM HYDROXICE/SIMETHICONE 120; 1200; 1200 MG/30ML; MG/30ML; MG/30ML
30 SUSPENSION ORAL EVERY 6 HOURS PRN
Status: DISCONTINUED | OUTPATIENT
Start: 2021-12-22 | End: 2021-12-26 | Stop reason: HOSPADM

## 2021-12-22 RX ORDER — ONDANSETRON 2 MG/ML
4 INJECTION INTRAMUSCULAR; INTRAVENOUS ONCE
Status: COMPLETED | OUTPATIENT
Start: 2021-12-22 | End: 2021-12-22

## 2021-12-22 RX ORDER — OLANZAPINE 5 MG/1
5 TABLET ORAL EVERY 8 HOURS PRN
Status: DISCONTINUED | OUTPATIENT
Start: 2021-12-22 | End: 2021-12-22

## 2021-12-22 RX ORDER — HALOPERIDOL 5 MG
5 TABLET ORAL EVERY 6 HOURS PRN
Status: DISCONTINUED | OUTPATIENT
Start: 2021-12-22 | End: 2021-12-26 | Stop reason: HOSPADM

## 2021-12-22 RX ORDER — VITAMIN B COMPLEX
2000 TABLET ORAL DAILY
Status: DISCONTINUED | OUTPATIENT
Start: 2021-12-22 | End: 2021-12-26 | Stop reason: HOSPADM

## 2021-12-22 RX ORDER — METOPROLOL TARTRATE 50 MG/1
50 TABLET, FILM COATED ORAL 2 TIMES DAILY
Status: DISCONTINUED | OUTPATIENT
Start: 2021-12-22 | End: 2021-12-26 | Stop reason: HOSPADM

## 2021-12-22 RX ORDER — CLONAZEPAM 1 MG/1
1 TABLET ORAL 2 TIMES DAILY
Status: DISCONTINUED | OUTPATIENT
Start: 2021-12-22 | End: 2021-12-22

## 2021-12-22 RX ORDER — POTASSIUM CHLORIDE 20 MEQ/1
40 TABLET, EXTENDED RELEASE ORAL ONCE
Status: COMPLETED | OUTPATIENT
Start: 2021-12-22 | End: 2021-12-22

## 2021-12-22 RX ORDER — SIMVASTATIN 40 MG
40 TABLET ORAL NIGHTLY
Status: DISCONTINUED | OUTPATIENT
Start: 2021-12-22 | End: 2021-12-22

## 2021-12-22 RX ORDER — HALOPERIDOL 5 MG/ML
2 INJECTION INTRAMUSCULAR ONCE
Status: COMPLETED | OUTPATIENT
Start: 2021-12-22 | End: 2021-12-22

## 2021-12-22 RX ORDER — HALOPERIDOL 5 MG/ML
5 INJECTION INTRAMUSCULAR EVERY 6 HOURS PRN
Status: DISCONTINUED | OUTPATIENT
Start: 2021-12-22 | End: 2021-12-26 | Stop reason: HOSPADM

## 2021-12-22 RX ORDER — OLANZAPINE 10 MG/1
10 INJECTION, POWDER, LYOPHILIZED, FOR SOLUTION INTRAMUSCULAR EVERY 8 HOURS PRN
Status: DISCONTINUED | OUTPATIENT
Start: 2021-12-22 | End: 2021-12-22

## 2021-12-22 RX ORDER — CLONAZEPAM 1 MG/1
1 TABLET ORAL 2 TIMES DAILY
Status: DISCONTINUED | OUTPATIENT
Start: 2021-12-22 | End: 2021-12-26 | Stop reason: HOSPADM

## 2021-12-22 RX ORDER — DIPHENHYDRAMINE HCL 25 MG
25 TABLET ORAL NIGHTLY PRN
Status: DISCONTINUED | OUTPATIENT
Start: 2021-12-22 | End: 2021-12-26 | Stop reason: HOSPADM

## 2021-12-22 RX ORDER — HYDROXYZINE PAMOATE 25 MG/1
25 CAPSULE ORAL EVERY 6 HOURS PRN
Status: DISCONTINUED | OUTPATIENT
Start: 2021-12-22 | End: 2021-12-24

## 2021-12-22 RX ORDER — TRAZODONE HYDROCHLORIDE 50 MG/1
50 TABLET ORAL NIGHTLY PRN
Status: DISCONTINUED | OUTPATIENT
Start: 2021-12-22 | End: 2021-12-22

## 2021-12-22 RX ORDER — IBUPROFEN 400 MG/1
400 TABLET ORAL EVERY 6 HOURS PRN
Status: DISCONTINUED | OUTPATIENT
Start: 2021-12-22 | End: 2021-12-26 | Stop reason: HOSPADM

## 2021-12-22 RX ORDER — BENZTROPINE MESYLATE 1 MG/ML
2 INJECTION INTRAMUSCULAR; INTRAVENOUS 2 TIMES DAILY PRN
Status: DISCONTINUED | OUTPATIENT
Start: 2021-12-22 | End: 2021-12-26 | Stop reason: HOSPADM

## 2021-12-22 RX ADMIN — METOPROLOL TARTRATE 50 MG: 50 TABLET, FILM COATED ORAL at 22:10

## 2021-12-22 RX ADMIN — CLONAZEPAM 1 MG: 1 TABLET ORAL at 17:12

## 2021-12-22 RX ADMIN — METOPROLOL TARTRATE 50 MG: 50 TABLET, FILM COATED ORAL at 09:39

## 2021-12-22 RX ADMIN — SERTRALINE 100 MG: 100 TABLET, FILM COATED ORAL at 09:39

## 2021-12-22 RX ADMIN — HALOPERIDOL LACTATE 2 MG: 5 INJECTION, SOLUTION INTRAMUSCULAR at 02:59

## 2021-12-22 RX ADMIN — LEVOTHYROXINE SODIUM 137 MCG: 0.03 TABLET ORAL at 09:39

## 2021-12-22 RX ADMIN — POTASSIUM CHLORIDE 40 MEQ: 1500 TABLET, EXTENDED RELEASE ORAL at 17:12

## 2021-12-22 RX ADMIN — METOPROLOL TARTRATE 50 MG: 25 TABLET, FILM COATED ORAL at 02:53

## 2021-12-22 RX ADMIN — Medication 2000 UNITS: at 09:39

## 2021-12-22 RX ADMIN — ONDANSETRON 4 MG: 2 INJECTION INTRAMUSCULAR; INTRAVENOUS at 03:56

## 2021-12-22 RX ADMIN — CLONAZEPAM 1 MG: 1 TABLET ORAL at 09:39

## 2021-12-22 SDOH — ECONOMIC STABILITY: HOUSING INSECURITY
IN THE LAST 12 MONTHS, WAS THERE A TIME WHEN YOU DID NOT HAVE A STEADY PLACE TO SLEEP OR SLEPT IN A SHELTER (INCLUDING NOW)?: NO

## 2021-12-22 ASSESSMENT — SLEEP AND FATIGUE QUESTIONNAIRES
DIFFICULTY FALLING ASLEEP: YES
DO YOU USE A SLEEP AID: NO
DO YOU HAVE DIFFICULTY SLEEPING: YES
DIFFICULTY STAYING ASLEEP: NO
DIFFICULTY ARISING: NO
RESTFUL SLEEP: YES
AVERAGE NUMBER OF SLEEP HOURS: 7.5
SLEEP PATTERN: DIFFICULTY FALLING ASLEEP

## 2021-12-22 ASSESSMENT — ENCOUNTER SYMPTOMS
COUGH: 0
EYES NEGATIVE: 1
GASTROINTESTINAL NEGATIVE: 1
SHORTNESS OF BREATH: 1

## 2021-12-22 ASSESSMENT — PATIENT HEALTH QUESTIONNAIRE - PHQ9: SUM OF ALL RESPONSES TO PHQ QUESTIONS 1-9: 13

## 2021-12-22 ASSESSMENT — LIFESTYLE VARIABLES
HOW OFTEN DO YOU HAVE A DRINK CONTAINING ALCOHOL: NEVER
HISTORY_ALCOHOL_USE: NO

## 2021-12-22 ASSESSMENT — SOCIAL DETERMINANTS OF HEALTH (SDOH): IN A TYPICAL WEEK, HOW MANY TIMES DO YOU TALK ON THE PHONE WITH FAMILY, FRIENDS, OR NEIGHBORS?: TWICE A WEEK

## 2021-12-22 NOTE — ED NOTES
Bed: A06-06  Expected date:   Expected time:   Means of arrival:   Comments:  nirali Mccloud, [facility]  12/22/21 0319

## 2021-12-22 NOTE — H&P
HISTORY AND PHYSICAL             Date: 12/22/2021        Patient Name: Jackelyn Baez     YOB: 1955      Age:  73 y.o. Chief Complaint   No chief complaint on file. This cher keeps calling me and makes me give him oral sex. History Obtained From   patient    History of Present Illness     Loc: Psych  Sev:  Severe  Duration:  Per chart, likely less than a week  Assoc:  Paranoid delusions, anxiety  Context:  Out of klonopin for over a week. Past Medical History     Past Medical History:   Diagnosis Date    Anxiety 06/02/2011    Bipolar affective disorder ([facility] 06/23/2010    Bipolar disorder ([facility]     Diaphragmatic hernia without mention of obstruction or gangrene     Essential hypertension 06/06/2017    Gastroesophageal reflux disease without esophagitis 06/06/2017    Hyperlipidemia 05/26/2010    Hypothyroidism 06/06/2017    Left ovarian cyst 06/06/2017    Recurrent major depressive disorder, in full remission ([facility] 06/06/2017    Vitamin D deficiency 05/26/2010        Past Surgical History     Past Surgical History:   Procedure Laterality Date    COLONOSCOPY  2006    HYSTERECTOMY  06/2000    Dr. Vickey Peterson - vaginal hysterectomy with anterior and posterior repair for a symptomatic second-degree uterovaginal prolapse complicated by a same-day post-operative intraperitoneal hemorrhage        Medications Prior to Admission     Prior to Admission medications    Medication Sig Start Date End Date Taking?  Authorizing Provider   levothyroxine (SYNTHROID) 137 MCG tablet Take 1 tablet by mouth daily 9/6/17   Ra Cespedes MD   Cholecalciferol (VITAMIN D) 2000 units CAPS capsule Take 1 capsule by mouth daily 9/6/17   Ra Cespedes MD   clonazePAM Norlin Brow) 1 MG tablet Take 1 tablet by mouth 2 times daily Do not fill until September 28, 2017 9/6/17   Ra Cespedes MD   metoprolol tartrate (LOPRESSOR) 50 MG tablet Take 1 tablet by mouth 2 times daily 6/6/17 Donnalee Habermann, MD        Allergies   Aripiprazole, Divalproex sodium, Bupropion, Buspirone, Codeine, Fluoxetine, Lithium, Olanzapine, Other, Quetiapine, Risperidone, Statins, Valproic acid, and Vit d-vit e-safflower oil    Social History     Social History     Tobacco History     Smoking Status  Former Smoker    Smokeless Tobacco Use  Never Used    Tobacco Comment  started to smoke in 1992 - quit smoking in 1997 or 1998          Alcohol History     Alcohol Use Status  No          Drug Use     Drug Use Status  Yes Types  Marijuana (Weed) Frequency   2 times/week          Sexual Activity     Sexually Active  Not Currently Partners  Male            Lives alone. , three adult children, has grandchildren that she loves. Was LPN, now retired. Family History     Family History   Problem Relation Age of Onset   [facility] Schizophrenia Mother     Cancer Mother         lung cancer    Lung Cancer Mother     Substance Abuse Mother         smoker    Mental Illness Daughter     Emphysema Father     Substance Abuse Father         smoker    COPD Father     High Blood Pressure Father     Hypertension Father     High Blood Pressure Brother     Substance Abuse Brother         smoker    Other Brother         aortic disection (repaired)    Hypertension Brother     Thyroid Disease Sister         untreated hypothyroidism    Substance Abuse Sister         smoker    High Blood Pressure Brother     Hypertension Brother     Cancer Brother         basal cell carcinoma of the skin    Immunodeficiency Son         HIV     Mental Illness Daughter        Review of Systems     Anxiety, tremor, poor sleep. Other systems negative. Physical Exam   BP (!) 131/92   Pulse 79   Temp 98.2 °F (36.8 °C) (Temporal)   Resp 18   SpO2 98%     Physical Exam  Psychiatric:         Attention and Perception: Attention normal.         Mood and Affect: Mood is anxious. Affect is tearful.          Speech: Speech normal. Behavior: Behavior normal.         Thought Content: Thought content is paranoid and delusional.         Cognition and Memory: Cognition and memory normal.         Judgment: Judgment is impulsive. Comments: No KIM  Wearing hospital garb. Cooperative, intense eye contact. Paranoid. Thought process tangential  Not noted to be hallucinating. Alert and oriented times 3         Labs      Recent Results (from the past 24 hour(s))   EKG 12 Lead    Collection Time: 12/22/21 12:58 AM   Result Value Ref Range    Ventricular Rate 73 BPM    Atrial Rate 73 BPM    P-R Interval 178 ms    QRS Duration 94 ms    Q-T Interval 444 ms    QTc Calculation (Bazett) 489 ms    P Axis 61 degrees    R Axis -35 degrees    T Axis 33 degrees    Diagnosis       EKG performed in ER and to be interpreted by ER physician. Confirmed by MD, ER ([id number]),  José Miguel Hillman ([id number]) on 12/22/2021 6:09:59 AM   Urinalysis, reflex to microscopic (Lab)    Collection Time: 12/22/21  1:22 AM   Result Value Ref Range    Color, UA Yellow Straw/Yellow    Clarity, UA Clear Clear    Glucose, Ur Negative Negative mg/dL    Bilirubin Urine SMALL (A) Negative    Ketones, Urine 15 (A) Negative mg/dL    Specific Gravity, UA >=1.030 1.005 - 1.030    Blood, Urine SMALL (A) Negative    pH, UA 5.5 5.0 - 8.0    Protein, UA TRACE (A) Negative mg/dL    Urobilinogen, Urine 0.2 <2.0 E.U./dL    Nitrite, Urine Negative Negative    Leukocyte Esterase, Urine Negative Negative    Microscopic Examination YES     Urine Type Voided    Urine Drug Screen    Collection Time: 12/22/21  1:22 AM   Result Value Ref Range    Amphetamine Screen, Urine Neg Negative <1000ng/mL    Barbiturate Screen, Ur Neg Negative <200 ng/mL    Benzodiazepine Screen, Urine Neg Negative <200 ng/mL    Cannabinoid Scrn, Ur POSITIVE (A) Negative <50 ng/mL    Cocaine Metabolite Screen, Urine Neg Negative <300 ng/mL    Opiate Scrn, Ur Neg Negative <300 ng/mL    PCP Screen, Urine Neg Negative <25 ng/mL    Methadone Screen, Urine Neg Negative <300 ng/mL    Propoxyphene Scrn, Ur Neg Negative <300 ng/mL    Oxycodone Urine Neg Negative <100 ng/ml    pH, UA 5.5     Drug Screen Comment: see below    Microscopic Urinalysis    Collection Time: 12/22/21  1:22 AM   Result Value Ref Range    WBC, UA 0-2 0 - 5 /HPF    RBC, UA 3-4 0 - 4 /HPF    Bacteria, UA Rare (A) None Seen /HPF   CBC auto differential    Collection Time: 12/22/21  1:29 AM   Result Value Ref Range    WBC 9.3 4.0 - 11.0 K/uL    RBC 5.11 4.00 - 5.20 M/uL    Hemoglobin 15.4 12.0 - 16.0 g/dL    Hematocrit 44.9 36.0 - 48.0 %    MCV 88.0 80.0 - 100.0 fL    MCH 30.1 26.0 - 34.0 pg    MCHC 34.2 31.0 - 36.0 g/dL    RDW 13.5 12.4 - 15.4 %    Platelets 070 564 - 286 K/uL    MPV 8.3 5.0 - 10.5 fL    Neutrophils % 61.9 %    Lymphocytes % 28.3 %    Monocytes % 6.5 %    Eosinophils % 1.0 %    Basophils % 2.3 %    Neutrophils Absolute 5.8 1.7 - 7.7 K/uL    Lymphocytes Absolute 2.6 1.0 - 5.1 K/uL    Monocytes Absolute 0.6 0.0 - 1.3 K/uL    Eosinophils Absolute 0.1 0.0 - 0.6 K/uL    Basophils Absolute 0.2 0.0 - 0.2 K/uL   Basic Metabolic Panel (EP - 1)    Collection Time: 12/22/21  1:29 AM   Result Value Ref Range    Sodium 140 136 - 145 mmol/L    Potassium 3.2 (L) 3.5 - 5.1 mmol/L    Chloride 105 99 - 110 mmol/L    CO2 19 (L) 21 - 32 mmol/L    Anion Gap 16 3 - 16    Glucose 108 (H) 70 - 99 mg/dL    BUN 21 (H) 7 - 20 mg/dL    CREATININE 0.7 0.6 - 1.2 mg/dL    GFR Non-African American >60 >60    GFR African American >60 >60    Calcium 9.2 8.3 - 10.6 mg/dL   Troponin    Collection Time: 12/22/21  1:29 AM   Result Value Ref Range    Troponin <0.01 <0.01 ng/mL   Brain Natriuretic Peptide    Collection Time: 12/22/21  1:29 AM   Result Value Ref Range    Pro- (H) 0 - 124 pg/mL   Blood Gas, Venous    Collection Time: 12/22/21  1:29 AM   Result Value Ref Range    pH, Lon 7.514 (H) 7.350 - 7.450    pCO2, Lon 24.7 (L) 41.0 - 51.0 mmHg    pO2, Lon 62.6 (H) 25.0 - 40.0 mmHg    HCO3, Venous 19.9 (L) 24.0 - 28.0 mmol/L    Base Excess, Lon -1.2 -2.0 - 3.0 mmol/L    O2 Sat, Lon 96 Not established %    Carboxyhemoglobin 0.6 0.0 - 1.5 %    MetHgb, Lon 0.1 0.0 - 1.5 %    TC02 (Calc), Lon 21 mmol/L    Hemoglobin, Lon, Reduced 4.30 %   Ethanol    Collection Time: 12/22/21  1:29 AM   Result Value Ref Range    Ethanol Lvl None Detected mg/dL   Salicylate    Collection Time: 12/22/21  1:29 AM   Result Value Ref Range    Salicylate, Serum <3.9 (L) 15.0 - 30.0 mg/dL   Acetaminophen level    Collection Time: 12/22/21  1:29 AM   Result Value Ref Range    Acetaminophen Level <5 (L) 10 - 30 ug/mL   COVID-19, Rapid    Collection Time: 12/22/21  1:29 AM    Specimen: Nasopharyngeal Swab   Result Value Ref Range    SARS-CoV-2, NAAT Not Detected Not Detected   TSH without Reflex    Collection Time: 12/22/21 10:15 AM   Result Value Ref Range    TSH 32.52 (H) 0.27 - 4.20 uIU/mL        Imaging/Diagnostics Last 24 Hours   XR CHEST PORTABLE    Result Date: 12/22/2021  EXAM: PORTABLE AP CHEST X-RAY, 0141 hours INDICATION: short of breath COMPARISON: 12/3/2007 FINDINGS: The lungs are hyperinflated. There is no focal consolidation, pleural effusion, or pneumothorax. The cardiomediastinal silhouette is normal. The visible bony thorax is intact. No acute pulmonary disease. Emphysema. Assessment        Ms. Stefano Leone is a 73year old female with a history in her chart of depression and Bipolar Disorder but with no recent hospitalizations who appears to have run out of her klonopin a few weeks ago. She was ultimately brought to the ER by her so due to his becoming alarmed by her bizarre speech. She has been noted to be paranoid. While it is possible that she is currently in a manic episode, it cannot be ruled out that the nature of this episode is actually benzodiazepine withdrawal delirium. Her Klonopin has been restarted.   Since her QTc is long (489) and she has a history of multiple drug allergies, will wait to consider antipsychotic treatment until tomorrow. Hospital Problems           Last Modified POA    * (Principal) Psychotic disorder with delusions ([facility] 12/22/2021 Yes    Bipolar affective disorder ([facility] 12/22/2021 Yes    Overview Signed 6/23/2010  9:41 AM by Verito Hernandez MD     Hx of hospitalization         Bipolar 1 disorder ([facility] 12/22/2021 Yes    Chronic prescription benzodiazepine use (Chronic) 12/22/2021 Yes      Prolonged QTc  R/O Benzodiazepine withdrawal delirium  Hypothyroid    Plan   1. Treatment team has met. She will start comprehensive inpatient psychiatric treatment including groups, OT, recreation and milieu. Social Work will be meeting with the patient. 2. Klonopin was restarted  3. BP and thyroid meds were restarted. 4.  EKG recheck in AM    Consultations Ordered:  IP CONSULT TO [facility]    Electronically signed by Lolita Boyd MD on 12/22/21 at 11:44 AM EST

## 2021-12-22 NOTE — BH NOTE
[address] [facility]  Admission Note     Admission Type:   Admission Type: Voluntary    Reason for admission:  Reason for Admission: shellie, delusions (son called police)    PATIENT STRENGTHS:  Strengths:  (STEVEN)    Patient Strengths and Limitations:  Limitations:  (STEVEN)    Addictive Behavior:   Addictive Behavior  In the past 3 months, have you felt or has someone told you that you have a problem with:  :  (STEVEN)  Do you have a history of Chemical Use?:  (STEVEN)  Do you have a history of Alcohol Use?:  (STEVEN)  Do you have a history of Street Drug Abuse?:  (STEVEN)  Histroy of Prescripton Drug Abuse?:  (STEVEN)    Medical Problems:   Past Medical History:   Diagnosis Date    Anxiety 06/02/2011    Bipolar affective disorder ([facility] 06/23/2010    Bipolar disorder ([facility]     Diaphragmatic hernia without mention of obstruction or gangrene     Essential hypertension 06/06/2017    Gastroesophageal reflux disease without esophagitis 06/06/2017    Hyperlipidemia 05/26/2010    Hypothyroidism 06/06/2017    Left ovarian cyst 06/06/2017    Recurrent major depressive disorder, in full remission ([facility] [facility] 06/06/2017    Vitamin D deficiency 05/26/2010       Status EXAM:  Status and Exam  Normal: No  Facial Expression: Worried  Affect: Congruent  Level of Consciousness: Alert  Mood:Normal: No  Mood: Guilty,Anxious  Motor Activity:Normal: No  Motor Activity: Decreased  Interview Behavior: Cooperative  Preception: [place] to Person,[place] to Time,[place] to Place,[place] to Situation  Attention:Normal: No  Attention: Unable to Concentrate  Thought Processes: Tangential,Flt. of Ideas  Thought Content:Normal: Yes  Hallucinations: None  Delusions: No (none voiced)  Memory:Normal: Yes  Insight and Judgment: No  Insight and Judgment: Poor Judgment  Present Suicidal Ideation: No  Present Homicidal Ideation: No    Tobacco Screening:  Practical Counseling, on admission, lisa X, if applicable and completed (first 3 are required if patient doesn't refuse):            ( )  Recognizing danger situations (included triggers and roadblocks)                    ( )  Coping skills (new ways to manage stress, exercise, relaxation techniques, changing routine, distraction)                                                           ( )  Basic information about quitting (benefits of quitting, techniques in how to quit, available resources  ( ) Referral for counseling faxed to [facility]                                           ( ) Patient refused counseling  ( ) Patient has not smoked in the last 30 days    Metabolic Screening:    No results found for: LABA1C    Lab Results   Component Value Date    CHOL 237 (H) 06/07/2017    CHOL 276 (H) 05/04/2011    CHOL 281 (H) 06/21/2010     Lab Results   Component Value Date    TRIG 341 (H) 06/07/2017    TRIG 237 (H) 05/04/2011    TRIG 181 (H) 06/21/2010     Lab Results   Component Value Date    HDL 25 (L) 06/07/2017    HDL 35 (L) 05/04/2011    HDL 36 (L) 06/21/2010     No components found for: LDLCAL  Lab Results   Component Value Date    LABVLDL see below 06/07/2017    LABVLDL 47 05/04/2011    LABVLDL 36 06/21/2010         There is no height or weight on file to calculate BMI. BP Readings from Last 2 Encounters:   12/22/21 (!) 131/92   12/22/21 (!) 125/90           Pt admitted with followings belongings:  Dental Appliances: None  Vision - Corrective Lenses: Glasses  Hearing Aid: None  Jewelry: None  Body Piercings Removed: N/A  Clothing: Pants,Shirt,Undergarments (Comment),Footwear  Were All Patient Medications Collected?: Not Applicable  Other Valuables: Other (Comment) (security bag #[id number])     Patient's home medications were n/a. Patient oriented to surroundings and program expectations and copy of patient rights given. Received admission packet:  yes. Consents reviewed, signed yes. Refused no. Patient verbalize understanding:  yes.   Patient education on precautions: yes                   Elina Lions, RN

## 2021-12-22 NOTE — BH NOTE
Pt unable to give pharmacy name from [facility]. Local pharmacies listed do not have current prescriptions within the last 2 years.

## 2021-12-22 NOTE — PROGRESS NOTES
[facility]  [phone] verified    Recent prescriptions    Sertraline 150 mg daily  metroprolol 50 mg bid  Synthroid 112 mcg daily  Clonazepam 1 mg bid
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
## 2021-12-22 NOTE — H&P
Hospital Medicine History & Physical      PCP: No primary care provider on file. Date of Admission: 12/22/2021    Date of Service: Pt seen/examined on 12/22/2021      Chief Complaint:  No chief complaint on file. History Of Present Illness: The patient is a 73 y.o. female with HTN, hypothyroidism who presented to [facility] ED for psychotic disorder with delusions. Patient was seen and evaluated in the ED by the ED medical provider, patient was medically cleared for admission to [facility]. This note serves as an admission medical H&P. Tobacco use: Denies  ETOH use: Denies  Illicit drug use: Denies. Tox screen with THC    Patient denies any medical complaints     Past Medical History:        Diagnosis Date    Anxiety 06/02/2011    Bipolar affective disorder ([address] 06/23/2010    Bipolar disorder ([facility] [address]     Diaphragmatic hernia without mention of obstruction or gangrene     Essential hypertension 06/06/2017    Gastroesophageal reflux disease without esophagitis 06/06/2017    Hyperlipidemia 05/26/2010    Hypothyroidism 06/06/2017    Left ovarian cyst 06/06/2017    Recurrent major depressive disorder, in full remission ([address] 06/06/2017    Vitamin D deficiency 05/26/2010       Past Surgical History:        Procedure Laterality Date    COLONOSCOPY  2006    HYSTERECTOMY  06/2000    Dr. Bautista Hughes - vaginal hysterectomy with anterior and posterior repair for a symptomatic second-degree uterovaginal prolapse complicated by a same-day post-operative intraperitoneal hemorrhage       Medications Prior to Admission:    Prior to Admission medications    Medication Sig Start Date End Date Taking?  Authorizing Provider   levothyroxine (SYNTHROID) 137 MCG tablet Take 1 tablet by mouth daily 9/6/17   Lucas Weston MD   Cholecalciferol (VITAMIN D) 2000 units CAPS capsule Take 1 capsule by mouth daily 9/6/17   Lucas Weston MD   clonazePAM (KLONOPIN) 1 MG tablet Take 1 tablet by mouth 2 times daily Do not fill until September 28, 2017 9/6/17   Lauryn Aj MD   metoprolol tartrate (LOPRESSOR) 50 MG tablet Take 1 tablet by mouth 2 times daily 6/6/17   Lauryn Aj MD       Allergies:  Aripiprazole, Divalproex sodium, Bupropion, Buspirone, Codeine, Fluoxetine, Lithium, Olanzapine, Other, Quetiapine, Risperidone, Statins, Valproic acid, and Vit d-vit e-safflower oil    Social History:  The patient currently lives at home alone    TOBACCO:   reports that she has quit smoking. She has never used smokeless tobacco.  ETOH:   reports no history of alcohol use.       Family History:   Positive as follows:        Problem Relation Age of Onset    Schizophrenia Mother     Cancer Mother         lung cancer    Lung Cancer Mother     Substance Abuse Mother         smoker    Mental Illness Daughter     Emphysema Father     Substance Abuse Father         smoker    COPD Father     High Blood Pressure Father     Hypertension Father     High Blood Pressure Brother     Substance Abuse Brother         smoker    Other Brother         aortic disection (repaired)   Buenrostro Hypertension Brother     Thyroid Disease Sister         untreated hypothyroidism    Substance Abuse Sister         smoker    High Blood Pressure Brother     Hypertension Brother     Cancer Brother         basal cell carcinoma of the skin    Immunodeficiency Son         HIV     Mental Illness Daughter        REVIEW OF SYSTEMS:       Constitutional: Negative for fever   HENT: Negative for sore throat   Eyes: Negative for redness   Respiratory: Negative  for dyspnea, cough   Cardiovascular: Negative for chest pain   Gastrointestinal: Negative for vomiting, diarrhea   Genitourinary: Negative for hematuria   Musculoskeletal: Negative for arthralgias   Skin: Negative for rash   Neurological: Negative for syncope    Hematological: Negative for easy bruising/bleeding   Psychiatric/Behavorial: Per psychiatry team evaluation PHYSICAL EXAM:    BP (!) 131/92   Pulse 79   Temp 98.2 °F (36.8 °C) (Temporal)   Resp 18   SpO2 98%     Gen: No distress. Alert. Eyes: PERRL. No sclera icterus. No conjunctival injection. ENT: No discharge. Pharynx clear. Neck: Trachea midline. Resp: No accessory muscle use. No crackles. + Minimal wheezes. No rhonchi. CV: Regular rate. Regular rhythm. No murmur. No rub. No edema. GI: Non-tender. Non-distended. Normal bowel sounds. Skin: Warm and dry. No nodule on exposed extremities. No rash on exposed extremities. M/S: No cyanosis. No joint deformity. No clubbing. Neuro: Awake. No focal neurologic deficit on exam.  Cranial nerves II through XII intact. Patient is able to ambulate without difficulty. Psych: Per psychiatry team evaluation     CBC:   Recent Labs     12/22/21 0129   WBC 9.3   HGB 15.4   HCT 44.9   MCV 88.0        BMP:   Recent Labs     12/22/21 0129      K 3.2*      CO2 19*   BUN 21*   CREATININE 0.7     UA:  Recent Labs     12/22/21 0122   COLORU Yellow   PHUR 5.5  5.5   WBCUA 0-2   RBCUA 3-4   BACTERIA Rare*   CLARITYU Clear   SPECGRAV >=1.030   LEUKOCYTESUR Negative   UROBILINOGEN 0.2   BILIRUBINUR SMALL*   BLOODU SMALL*   GLUCOSEU Negative       U/A:    Lab Results   Component Value Date    COLORU Yellow 12/22/2021    WBCUA 0-2 12/22/2021    RBCUA 3-4 12/22/2021    BACTERIA Rare 12/22/2021    CLARITYU Clear 12/22/2021    SPECGRAV >=1.030 12/22/2021    LEUKOCYTESUR Negative 12/22/2021    BLOODU SMALL 12/22/2021    GLUCOSEU Negative 12/22/2021     Results for Marda Cranker (MRN [id number]) as of 12/22/2021 12:01   Ref.  Range 12/22/2021 01:22   Amphetamine Screen, Urine Latest Ref Range: Negative <1000ng/mL  Neg   Benzodiazepine Screen, Urine Latest Ref Range: Negative <200 ng/mL  Neg   Cocaine Metabolite Screen, Urine Latest Ref Range: Negative <300 ng/mL  Neg   Methadone Screen, Urine Latest Ref Range: Negative <300 ng/mL  Neg   Opiate Scrn, Ur Latest Ref Range: Negative <300 ng/mL  Neg   Oxycodone Urine Latest Ref Range: Negative <100 ng/ml  Neg   PCP Screen, Urine Latest Ref Range: Negative <25 ng/mL  Neg   Cannabinoid Scrn, Ur Latest Ref Range: Negative <50 ng/mL  POSITIVE (A)     CULTURES  Results for Aníbal Calderón (MRN [id number]) as of 12/22/2021 12:01   Ref. Range 12/22/2021 01:29   SARS-CoV-2, NAAT Latest Ref Range: Not Detected  Not Detected     EKG:   EKG interpreted by myself.    Rate: 73  Rhythm: NSR  Axis: Left axis deviation  Intervals: within normal limits  ST Segments: no acute abnormality  T waves: no acute abnormality  Comparison: No comparison  Impression: NSR with no acute abnormality     RAFAEL Hauser      RADIOLOGY  No orders to display       ASSESSMENT/PLAN:  #Psychotic disorder with delusions  - per psychiatry team    #Hypokalemia  -3.2  -Give 40 mEq of K    #Hypothyroidism  -TSH 32.52  -Patient has not been taking medications  -Restart home Synthroid  -Will need PCP follow-up at discharge    #HTN  -BP stable  -Continue Lopressor   -Monitor BP    #Marijuana use  -Recommend cessation    Analisa GUAN  12/22/2021 12:01 PM

## 2021-12-22 NOTE — ED NOTES
[facility] at bedside to transport patient to [facility].      Kiah Boykin RN  12/22/21 [id number]

## 2021-12-22 NOTE — CARE COORDINATION
[facility]  Treatment Team Note  Day 1    Review Date & Time: 0900  12/22/2021    Patient was not in treatment team      Status EXAM:   Status and Exam  Normal: No  Facial Expression: Worried  Affect: Congruent  Level of Consciousness: Alert  Mood:Normal: No  Mood: Guilty,Anxious  Motor Activity:Normal: No  Motor Activity: Decreased  Interview Behavior: Cooperative  Preception: [place] to Person,[place] to Time,[place] to Place,[place] to Situation  Attention:Normal: No  Attention: Unable to Concentrate  Thought Processes: Tangential,Flt. of Ideas  Thought Content:Normal: Yes  Hallucinations: None  Delusions: No (none voiced)  Memory:Normal: Yes  Insight and Judgment: No  Insight and Judgment: Poor Judgment  Present Suicidal Ideation: No  Present Homicidal Ideation: No      Suicide Risk CSSR-S:  1) Within the past month, have you wished you were dead or wished you could go to sleep and not wake up? : No  2) Have you actually had any thoughts of killing yourself? : No  6) Have you ever done anything, started to do anything, or prepared to do anything to end your life?: No      PLAN/TREATMENT RECOMMENDATIONS UPDATE: Patient will take medication as prescribed, eat 75% of meals, attend groups, participate in milieu activities, participate in treatment team and care planning for discharge and follow up.       Brando Esquivel RN

## 2021-12-23 LAB
CHOLESTEROL, TOTAL: 284 MG/DL (ref 0–199)
EKG ATRIAL RATE: 75 BPM
EKG DIAGNOSIS: NORMAL
EKG P AXIS: 70 DEGREES
EKG P-R INTERVAL: 178 MS
EKG Q-T INTERVAL: 408 MS
EKG QRS DURATION: 94 MS
EKG QTC CALCULATION (BAZETT): 455 MS
EKG R AXIS: -32 DEGREES
EKG T AXIS: 38 DEGREES
EKG VENTRICULAR RATE: 75 BPM
HDLC SERPL-MCNC: 29 MG/DL (ref 40–60)
LDL CHOLESTEROL CALCULATED: 218 MG/DL
TRIGL SERPL-MCNC: 186 MG/DL (ref 0–150)
VLDLC SERPL CALC-MCNC: 37 MG/DL

## 2021-12-23 PROCEDURE — 99232 SBSQ HOSP IP/OBS MODERATE 35: CPT | Performed by: PSYCHIATRY & NEUROLOGY

## 2021-12-23 PROCEDURE — 93005 ELECTROCARDIOGRAM TRACING: CPT | Performed by: PSYCHIATRY & NEUROLOGY

## 2021-12-23 PROCEDURE — 93010 ELECTROCARDIOGRAM REPORT: CPT | Performed by: INTERNAL MEDICINE

## 2021-12-23 PROCEDURE — 6370000000 HC RX 637 (ALT 250 FOR IP): Performed by: PSYCHIATRY & NEUROLOGY

## 2021-12-23 PROCEDURE — 1240000000 HC EMOTIONAL WELLNESS R&B

## 2021-12-23 RX ADMIN — LEVOTHYROXINE SODIUM 137 MCG: 0.03 TABLET ORAL at 06:48

## 2021-12-23 RX ADMIN — METOPROLOL TARTRATE 50 MG: 50 TABLET, FILM COATED ORAL at 20:56

## 2021-12-23 RX ADMIN — METOPROLOL TARTRATE 50 MG: 50 TABLET, FILM COATED ORAL at 09:22

## 2021-12-23 RX ADMIN — Medication 2000 UNITS: at 09:22

## 2021-12-23 RX ADMIN — CLONAZEPAM 1 MG: 1 TABLET ORAL at 17:02

## 2021-12-23 RX ADMIN — CLONAZEPAM 1 MG: 1 TABLET ORAL at 09:22

## 2021-12-23 NOTE — PROGRESS NOTES
Department of Psychiatry  AttendingProgress Note    Chief Complaint: \"I'm going to move to another apartment\"    The treatment team met and discussed the treatment plan. SUBJECTIVE:    Patient is feeling better. Suicidal ideation:  denies suicidal ideation. Patient does not have medication side effects. ROS: Patient has new complaints: no  Sleeping adequately:  Yes   Appetite adequate: Yes  Attending groups: Yes  Visitors:No    OBJECTIVE    Physical  VITALS:  /88   Pulse 93   Temp 97.7 °F (36.5 °C) (Temporal)   Resp 16   SpO2 96%     Mental Status Examination:  Patients appearance was hospital attire, in chair, fair grooming and fair hygiene. Thoughts are Goal directed and Logical. Homicidal ideations none. No abnormal movements, tics or mannerisms. Memory intact Aims 0. Concentration Good. Alert and oriented X 4. Insight and Judgement normal insight and judgment. Patient was cooperative.  Patient gait normal. Mood anxious, affect anxiety Hallucinations Absent, suicidal ideations no specific plan to harm self Speech normal pitch and normal volume  Data  Labs:   Admission on 12/22/2021   Component Date Value Ref Range Status    TSH 12/22/2021 32.52* 0.27 - 4.20 uIU/mL Final   Admission on 12/22/2021, Discharged on 12/22/2021   Component Date Value Ref Range Status    WBC 12/22/2021 9.3  4.0 - 11.0 K/uL Final    RBC 12/22/2021 5.11  4.00 - 5.20 M/uL Final    Hemoglobin 12/22/2021 15.4  12.0 - 16.0 g/dL Final    Hematocrit 12/22/2021 44.9  36.0 - 48.0 % Final    MCV 12/22/2021 88.0  80.0 - 100.0 fL Final    MCH 12/22/2021 30.1  26.0 - 34.0 pg Final    MCHC 12/22/2021 34.2  31.0 - 36.0 g/dL Final    RDW 12/22/2021 13.5  12.4 - 15.4 % Final    Platelets 91/25/3776 295  135 - 450 K/uL Final    MPV 12/22/2021 8.3  5.0 - 10.5 fL Final    Neutrophils % 12/22/2021 61.9  % Final    Lymphocytes % 12/22/2021 28.3  % Final    Monocytes % 12/22/2021 6.5  % Final    Eosinophils % 12/22/2021 1.0  % 62.6* 25.0 - 40.0 mmHg Final    HCO3, Venous 12/22/2021 19.9* 24.0 - 28.0 mmol/L Final    Base Excess, Lon 12/22/2021 -1.2  -2.0 - 3.0 mmol/L Final    O2 Sat, Lon 12/22/2021 96  Not established % Final    Carboxyhemoglobin 12/22/2021 0.6  0.0 - 1.5 % Final    Comment:      0.0-1.5  (Smokers 1.5-5.0)      MetHgb, Lon 12/22/2021 0.1  0.0 - 1.5 % Final    TC02 (Calc), Lon 12/22/2021 21  mmol/L Final    Hemoglobin, Lon, Reduced 12/22/2021 4.30  % Final    Ventricular Rate 12/22/2021 73  BPM Final    Atrial Rate 12/22/2021 73  BPM Final    P-R Interval 12/22/2021 178  ms Final    QRS Duration 12/22/2021 94  ms Final    Q-T Interval 12/22/2021 444  ms Final    QTc Calculation (Bazett) 12/22/2021 489  ms Final    P Axis 12/22/2021 61  degrees Final    R Axis 12/22/2021 -35  degrees Final    T Axis 12/22/2021 33  degrees Final    Diagnosis 12/22/2021 EKG performed in ER and to be interpreted by ER physician. Confirmed by MD, ER ([id number]),  Medina Tovar ([id number]) on 12/22/2021 6:09:59 AM   Final    Ethanol Lvl 12/22/2021 None Detected  mg/dL Final    Comment:    None Detected  Conversion factor:  100 mg/dl = .100 g/dl  For Medical Purposes Only      Salicylate, Serum 68/21/9907 <0.3* 15.0 - 30.0 mg/dL Final    Comment: Therapeutic Range: 15.0-30.0 mg/dL  Toxic: >30.0 mg/dL      Acetaminophen Level 12/22/2021 <5* 10 - 30 ug/mL Final    Comment: Therapeutic Range: 10.0-30.0 ug/mL  Toxic: >=150 ug/mL      Color, UA 12/22/2021 Yellow  Straw/Yellow Final    Clarity, UA 12/22/2021 Clear  Clear Final    Glucose, Ur 12/22/2021 Negative  Negative mg/dL Final    Bilirubin Urine 12/22/2021 SMALL* Negative Final    Ketones, Urine 12/22/2021 15* Negative mg/dL Final    Specific Gravity, UA 12/22/2021 >=1.030  1.005 - 1.030 Final    Blood, Urine 12/22/2021 SMALL* Negative Final    pH, UA 12/22/2021 5.5  5.0 - 8.0 Final    Protein, UA 12/22/2021 TRACE* Negative mg/dL Final    Urobilinogen, Urine 12/22/2021 0.2 <2.0 E.U./dL Final    Nitrite, Urine 12/22/2021 Negative  Negative Final    Leukocyte Esterase, Urine 12/22/2021 Negative  Negative Final    Microscopic Examination 12/22/2021 YES   Final    Urine Type 12/22/2021 Voided   Final    Amphetamine Screen, Urine 12/22/2021 Neg  Negative <1000ng/mL Final    Barbiturate Screen, Ur 12/22/2021 Neg  Negative <200 ng/mL Final    Benzodiazepine Screen, Urine 12/22/2021 Neg  Negative <200 ng/mL Final    Cannabinoid Scrn, Ur 12/22/2021 POSITIVE* Negative <50 ng/mL Final    Cocaine Metabolite Screen, Urine 12/22/2021 Neg  Negative <300 ng/mL Final    Opiate Scrn, Ur 12/22/2021 Neg  Negative <300 ng/mL Final    Comment: \"Therapeutic levels of pain medication, especially oxycontin and synthetic  opioids, may not be detected by this Methodology. Pain management screen  panel  Drug panel-PM-Hi Res Ur, Interp (PAIN) should be considered for drug  monitoring \".  PCP Screen, Urine 12/22/2021 Neg  Negative <25 ng/mL Final    Methadone Screen, Urine 12/22/2021 Neg  Negative <300 ng/mL Final    Propoxyphene Scrn, Ur 12/22/2021 Neg  Negative <300 ng/mL Final    Oxycodone Urine 12/22/2021 Neg  Negative <100 ng/ml Final    pH, UA 12/22/2021 5.5   Final    Comment: Urine pH less than 5.0 or greater than 8.0 may indicate sample adulteration. Another sample should be collected if clinically  indicated.  Drug Screen Comment: 12/22/2021 see below   Final    Comment: This method is a screening test to detect only these drug  classes as part of a medical workup. Confirmatory testing  by another method should be ordered if clinically indicated.  SARS-CoV-2, NAAT 12/22/2021 Not Detected  Not Detected Final    Comment: Rapid NAAT:   Negative results should be treated as presumptive and,  if inconsistent with clinical signs and symptoms or necessary for  patient management, should be tested with an alternative molecular  assay.  Negative results do not preclude SARS-CoV-2 infection and  should not be used as the sole basis for patient management decisions. This test has been authorized by the FDA under an Emergency Use  Authorization (EUA) for use by authorized laboratories.     Fact sheet for Healthcare Providers:  SeekCultures.si  Fact sheet for Patients: SeekCultures.si    METHODOLOGY: Isothermal Nucleic Acid Amplification      WBC, UA 12/22/2021 0-2  0 - 5 /HPF Final    RBC, UA 12/22/2021 3-4  0 - 4 /HPF Final    Bacteria, UA 12/22/2021 Rare* None Seen /HPF Final            Medications  Current Facility-Administered Medications: acetaminophen (TYLENOL) tablet 650 mg, 650 mg, Oral, Q4H PRN  ibuprofen (ADVIL;MOTRIN) tablet 400 mg, 400 mg, Oral, Q6H PRN  magnesium hydroxide (MILK OF MAGNESIA) 400 MG/5ML suspension 30 mL, 30 mL, Oral, Daily PRN  aluminum & magnesium hydroxide-simethicone (MAALOX) 200-200-20 MG/5ML suspension 30 mL, 30 mL, Oral, Q6H PRN  sterile water injection 2.1 mL, 2.1 mL, IntraMUSCular, Q4H PRN  benztropine mesylate (COGENTIN) injection 2 mg, 2 mg, IntraMUSCular, BID PRN  hydrOXYzine (VISTARIL) capsule 25 mg, 25 mg, Oral, Q6H PRN  haloperidol (HALDOL) tablet 5 mg, 5 mg, Oral, Q6H PRN **OR** haloperidol lactate (HALDOL) injection 5 mg, 5 mg, IntraMUSCular, Q6H PRN  metoprolol tartrate (LOPRESSOR) tablet 50 mg, 50 mg, Oral, BID  levothyroxine (SYNTHROID) tablet 137 mcg, 137 mcg, Oral, Daily  Vitamin D (CHOLECALCIFEROL) tablet 2,000 Units, 2,000 Units, Oral, Daily  clonazePAM (KLONOPIN) tablet 1 mg, 1 mg, Oral, BID  diphenhydrAMINE (BENADRYL) tablet 25 mg, 25 mg, Oral, Nightly PRN  influenza quadrivalent split vaccine (FLUZONE;FLUARIX;FLULAVAL;AFLURIA) injection 0.5 mL, 0.5 mL, IntraMUSCular, Prior to discharge    ASSESSMENT AND PLAN    Principal Problem:    Psychotic disorder with delusions ([facility] [address]  Active Problems:    Bipolar affective disorder ([address]    Hypertension    Bipolar 1 disorder (HCC)    Chronic prescription benzodiazepine use    Hypokalemia    Marijuana use  Resolved Problems:    * No resolved hospital problems. *     12/23/2021: Anxiety is improving. Says she is glad to have so much support here on the unit. She plans to move out of her current apartment and move to a senior development. EKG is pending. Since she has improved to such a great degree, AND according to family her main psychotic breaks in the past have had to do with benzodiazepine withdrawal, AND she has trouble tolerating antipsychotics, will hold off on starting anything else right now. Patient is in agreement with this plan. Probable discharge tomorrow. 12/22/2021:      Ms. Florestine Gaucher is a 73year old female with a history in her chart of depression and Bipolar Disorder but with no recent hospitalizations who appears to have run out of her klonopin a few weeks ago. She was ultimately brought to the ER by her so due to his becoming alarmed by her bizarre speech. She has been noted to be paranoid.     While it is possible that she is currently in a manic episode, it cannot be ruled out that the nature of this episode is actually benzodiazepine withdrawal delirium. Her Klonopin has been restarted. Since her QTc is long (489) and she has a history of multiple drug allergies, will wait to consider antipsychotic treatment until tomorrow. PLAN    1. Patient's symptoms   are improving  2. Probable discharge is tomorrow  3. Continue Klonopin 1 mg BID            Total time with patient was 40 minutes and more than 50 % of that time was spent counseling the patient on their symptoms, treatment and expected goals.

## 2021-12-23 NOTE — GROUP NOTE
Group Therapy Note    Date: 12/22/2021    Group Start Time: 2025  Group End Time: 2045  Group Topic: Wrap-Up    [facility]        Group Therapy Note    Attendees: goals and importance of goal setting discussed. Night time milieu activities discussed. Patient's Goal:  Excited about finally facing issues and getting better    Notes:  Successful     Status After Intervention:  Improved    Participation Level:  Active Listener and Interactive    Participation Quality: Appropriate and Attentive      Speech:  normal      Thought Process/Content: Logical  Linear      Affective Functioning: Congruent      Mood: depressed      Level of consciousness:  Alert and Oriented x4      Response to Learning: Progressing to goal      Endings: None Reported    Modes of Intervention: Support      Discipline Responsible: Behavorial Health Tech      Signature:  Franchesca Ann

## 2021-12-23 NOTE — FLOWSHEET NOTE
12/23/21 1214   Encounter Summary   Services provided to: Patient   Referral/Consult From: Patient   Support System Family members   Continue Visiting   (12/23 initial, Epic consult, sppt and prayer)   Complexity of Encounter Moderate   Length of Encounter 15 minutes   Spiritual Assessment Completed Yes   Spiritual/Congregational   Type Spiritual support   Assessment Approachable; Anxious; Coping   Intervention Active listening;Explored feelings, thoughts, concerns;Explored coping resources;Prayer;Discussed relationship with God;Discussed illness/injury and it's impact   Outcome Comfort;Expressed gratitude;Engaged in conversation;Expressed feelings/needs/concerns; Shared reminiscences; Receptive;Venting emotion

## 2021-12-23 NOTE — PROGRESS NOTES
Patient up to team station wanting to get her second [place] vaccination. She did show staff her [place] vaccination card it shows her first injection Moderna on 8/3/21 at [facility].

## 2021-12-23 NOTE — PROGRESS NOTES
Patient up to team station to take her medication. She indicated that she had a good talk with her provider today and that she was informed she will be discharged tomorrow. She said that actually instead of her sister picking her up tomorrow because her sister did not invite her to Thad Yates (another patient) has invited her to leave with her tomorrow and spend [place] with her family. She believes that would be a better option for her. However, she will keep her options open still.

## 2021-12-23 NOTE — PROGRESS NOTES
Writer spoke to the pharmacy about her Sonu request. The pharmacy indicated that the patient would need to start over and should get either the [facility] or Lev Cast once discharged so they can get the second injection at the same place. The patient was informed of this information and was accepting of plan.

## 2021-12-23 NOTE — FLOWSHEET NOTE
Group Therapy Note    Date: 12/23/2021  Start Time: 1300  End Time:  8204  Number of Participants: 8    Type of Group: Music Group    Notes:  Pt present for Music Group. Pt participated by singing along to music. Participation Level:  Active Listener and Interactive    Participation Quality: Appropriate and Attentive      Speech:  normal      Affective Functioning: Congruent      Endings: None Reported    Modes of Intervention: Support, Socialization, Activity and Media      Discipline Responsible: Chaplain Mae Sauceda       12/23/21 1435   Encounter Summary   Services provided to: Patient   Continue Visiting   (12/23 Music Group)   Complexity of Encounter Moderate   Length of Encounter 45 minutes

## 2021-12-24 PROBLEM — F31.2 BIPOLAR DISORDER, CURRENT EPISODE MANIC SEVERE WITH PSYCHOTIC FEATURES (HCC): Status: ACTIVE | Noted: 2021-12-22

## 2021-12-24 LAB
ESTIMATED AVERAGE GLUCOSE: 102.5 MG/DL
HBA1C MFR BLD: 5.2 %

## 2021-12-24 PROCEDURE — 6370000000 HC RX 637 (ALT 250 FOR IP): Performed by: PSYCHIATRY & NEUROLOGY

## 2021-12-24 PROCEDURE — 1240000000 HC EMOTIONAL WELLNESS R&B

## 2021-12-24 PROCEDURE — 99233 SBSQ HOSP IP/OBS HIGH 50: CPT | Performed by: PSYCHIATRY & NEUROLOGY

## 2021-12-24 RX ORDER — MECOBALAMIN 5000 MCG
10 TABLET,DISINTEGRATING ORAL NIGHTLY
Status: DISCONTINUED | OUTPATIENT
Start: 2021-12-24 | End: 2021-12-26 | Stop reason: HOSPADM

## 2021-12-24 RX ORDER — LORAZEPAM 1 MG/1
1 TABLET ORAL ONCE
Status: COMPLETED | OUTPATIENT
Start: 2021-12-24 | End: 2021-12-24

## 2021-12-24 RX ADMIN — ALUMINUM HYDROXIDE, MAGNESIUM HYDROXIDE, AND SIMETHICONE 30 ML: 200; 200; 20 SUSPENSION ORAL at 21:21

## 2021-12-24 RX ADMIN — CARIPRAZINE 1.5 MG: 1.5 CAPSULE, GELATIN COATED ORAL at 11:54

## 2021-12-24 RX ADMIN — METOPROLOL TARTRATE 50 MG: 50 TABLET, FILM COATED ORAL at 21:18

## 2021-12-24 RX ADMIN — Medication 2000 UNITS: at 08:38

## 2021-12-24 RX ADMIN — CLONAZEPAM 1 MG: 1 TABLET ORAL at 17:43

## 2021-12-24 RX ADMIN — LORAZEPAM 1 MG: 1 TABLET ORAL at 12:09

## 2021-12-24 RX ADMIN — LEVOTHYROXINE SODIUM 137 MCG: 0.03 TABLET ORAL at 05:04

## 2021-12-24 RX ADMIN — METOPROLOL TARTRATE 50 MG: 50 TABLET, FILM COATED ORAL at 08:39

## 2021-12-24 RX ADMIN — ALUMINUM HYDROXIDE, MAGNESIUM HYDROXIDE, AND SIMETHICONE 30 ML: 200; 200; 20 SUSPENSION ORAL at 08:16

## 2021-12-24 RX ADMIN — CLONAZEPAM 1 MG: 1 TABLET ORAL at 08:39

## 2021-12-24 ASSESSMENT — PAIN SCALES - GENERAL
PAINLEVEL_OUTOF10: 0
PAINLEVEL_OUTOF10: 0

## 2021-12-24 NOTE — BH NOTE
Andie Lea spent time talking to a peer this AM. She now plans to go home with this person for [place]. \"I need to sign out, AMA\". \"You need to take me to my car\"  Instructed to wait for her doctor who is not here, yet.

## 2021-12-24 NOTE — BH NOTE
Purposeful Rounding    Patient Location: Patient room    Patient willing to engage in conversation: Yes    Presentation/behavior: Cooperative    Affect: Neutral/Euthymic(normal)    Concerns reported: None    PRN medications given: None    Environmental assessment: Room free from clutter, Clear path to bathroom  and Adequate lighting    Fall prevention interventions in place: Lighting appropriate, Room free of clutter and Clear path to bathroom    Daily [place] Fall Risk Score: 55    Daily [place] Fall Risk Score: 0      Electronically signed by Josef Ramirez RN on 12/23/21 at 10:41 PM EST

## 2021-12-24 NOTE — GROUP NOTE
Group Therapy Note    Date: 12/23/2021    Group Start Time: 2025  Group End Time: 2055  Group Topic: Wrap-Up    [facility]        Group Therapy Note    Attendees: Goals and importance of goal setting discussed. Night time milieu activities discussed. Patient's Goal:  Shower     Notes:  Successful     Status After Intervention:  Improved    Participation Level:  Active Listener and Interactive    Participation Quality: Appropriate and Attentive      Speech:  normal      Thought Process/Content: Logical  Linear      Affective Functioning: Blunted      Mood: depressed      Level of consciousness:  Alert and Oriented x4      Response to Learning: Progressing to goal      Endings: None Reported    Modes of Intervention: Support      Discipline Responsible: Behavorial Health Tech      Signature:  Donna Schaefer

## 2021-12-24 NOTE — PLAN OF CARE
Problem: Altered Mood, Manic Behavior:  Goal: Mood stable  Description: Mood stable  12/24/2021 1157 by Gloria Babcock LPN  Outcome: Ongoing  Given Vraylar information. Took this. Denied need for Ativan @ present.

## 2021-12-24 NOTE — PROGRESS NOTES
Late Entry      Behavioral Services  Medicare Certification Upon Admission    I certify that this patient's inpatient psychiatric hospital admission is medically necessary for:    [x] (1) Treatment which could reasonably be expected to improve this patient's condition,       [x] (2) Or for diagnostic study;     AND     [x](2) The inpatient psychiatric services are provided while the individual is under the care of a physician and are included in the individualized plan of care.     Estimated length of stay/service 5 d    Plan for post-hospital care [facility]    Electronically signed by Sally Duran MD on 12/24/2021 at 11:55 AM
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
## 2021-12-25 PROCEDURE — 6370000000 HC RX 637 (ALT 250 FOR IP): Performed by: PSYCHIATRY & NEUROLOGY

## 2021-12-25 PROCEDURE — 1240000000 HC EMOTIONAL WELLNESS R&B

## 2021-12-25 RX ADMIN — IBUPROFEN 400 MG: 400 TABLET, FILM COATED ORAL at 17:31

## 2021-12-25 RX ADMIN — CLONAZEPAM 1 MG: 1 TABLET ORAL at 08:00

## 2021-12-25 RX ADMIN — METOPROLOL TARTRATE 50 MG: 50 TABLET, FILM COATED ORAL at 08:01

## 2021-12-25 RX ADMIN — CARIPRAZINE 1.5 MG: 1.5 CAPSULE, GELATIN COATED ORAL at 08:00

## 2021-12-25 RX ADMIN — LEVOTHYROXINE SODIUM 137 MCG: 0.03 TABLET ORAL at 06:02

## 2021-12-25 RX ADMIN — ALUMINUM HYDROXIDE, MAGNESIUM HYDROXIDE, AND SIMETHICONE 30 ML: 200; 200; 20 SUSPENSION ORAL at 16:09

## 2021-12-25 RX ADMIN — Medication 2000 UNITS: at 08:00

## 2021-12-25 RX ADMIN — IBUPROFEN 400 MG: 400 TABLET, FILM COATED ORAL at 09:26

## 2021-12-25 RX ADMIN — CLONAZEPAM 1 MG: 1 TABLET ORAL at 17:27

## 2021-12-25 RX ADMIN — METOPROLOL TARTRATE 50 MG: 50 TABLET, FILM COATED ORAL at 22:17

## 2021-12-25 ASSESSMENT — PAIN SCALES - GENERAL
PAINLEVEL_OUTOF10: 4
PAINLEVEL_OUTOF10: 5

## 2021-12-25 NOTE — BH NOTE
Received Ibuprofen 400 mg PO at 0926 to aid in decreasing low back discomfort and reported it was effective.

## 2021-12-25 NOTE — BH NOTE
Purposeful Rounding    Patient Location: Patient room    Patient willing to engage in conversation: Yes    Presentation/behavior: Cooperative    Affect: Neutral/Euthymic(normal)    Concerns reported: Heartburn    PRN medications given: Maalox    Environmental assessment: Room free from clutter, Clear path to bathroom  and Adequate lighting    Fall prevention interventions in place: Lighting appropriate, Room free of clutter and Clear path to bathroom    Daily [place] Fall Risk Score: 55    Daily Vega Fall Risk Score: 0      Electronically signed by Ammy Lea RN on 12/24/21 at 9:45 PM EST

## 2021-12-25 NOTE — BH NOTE
Purposeful Rounding    Patient Location: Nurses station    Patient willing to engage in conversation: Yes    Presentation/behavior: Guarded/Suspicious and Cooperative    Affect: Flat/blunted and Labile    Concerns reported:\" If I take a baby Asprin it brings down my blood pressure.  I don't have high blood pressure\"     PRN medications given: none at this time    Environmental assessment: No safety hazards noted    Fall prevention interventions in place: none required    Daily Josh Fall Risk Score: 55    Daily Vega Fall Risk Score: low      Electronically signed by Kindra Haas LPN on 91/52/67 at 9:54 AM EST

## 2021-12-25 NOTE — BH NOTE
Purposeful Rounding    Patient Location: Day room    Patient willing to engage in conversation: Yes    Presentation/behavior: Anxious, Imulsive and Cooperative    Affect: Labile    Concerns reported: has several medication request and directed to speak to provider about these.     PRN medications given: has received ibuprofen to aid in decreasing low back pain    Environmental assessment: No safety hazards noted    Fall prevention interventions in place: none required    Daily Josh Fall Risk Score: 55    Daily Vega Fall Risk Score: low      Electronically signed by Diego Bridges LPN on 90/06/44 at 0:69 PM EST

## 2021-12-25 NOTE — PLAN OF CARE
Problem: Altered Mood, Manic Behavior:  Goal: Able to verbalize decrease in frequency and intensity of racing thoughts  Description: Able to verbalize decrease in frequency and intensity of racing thoughts  Outcome: Ongoing  Note: Requesting multiple medications stating reason's she should take them. Informed provider would be made aware.      Problem: Altered Mood, Manic Behavior:  Goal: Ability to disclose and discuss suicidal ideas will improve  Description: Ability to disclose and discuss suicidal ideas will improve  Outcome: Met This Shift     Problem: Altered Mood, Manic Behavior:  Goal: Absence of self-harm  Description: Absence of self-harm  Outcome: Met This Shift
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
## 2021-12-26 ENCOUNTER — HOSPITAL ENCOUNTER (INPATIENT)
Age: 66
LOS: 4 days | Discharge: HOME | DRG: 885 | End: 2021-12-30
Attending: PSYCHIATRY & NEUROLOGY | Admitting: PSYCHIATRY & NEUROLOGY
Payer: MEDICARE

## 2021-12-26 VITALS
TEMPERATURE: 98.6 F | SYSTOLIC BLOOD PRESSURE: 157 MMHG | DIASTOLIC BLOOD PRESSURE: 103 MMHG | HEART RATE: 94 BPM | BODY MASS INDEX: 24.91 KG/M2 | HEIGHT: 66 IN | WEIGHT: 155 LBS | RESPIRATION RATE: 16 BRPM | OXYGEN SATURATION: 98 %

## 2021-12-26 DIAGNOSIS — F41.9 ANXIETY: Chronic | ICD-10-CM

## 2021-12-26 PROBLEM — F31.9 BIPOLAR 1 DISORDER (HCC): Status: ACTIVE | Noted: 2021-12-26

## 2021-12-26 PROCEDURE — 99239 HOSP IP/OBS DSCHRG MGMT >30: CPT | Performed by: PSYCHIATRY & NEUROLOGY

## 2021-12-26 PROCEDURE — 1240000000 HC EMOTIONAL WELLNESS R&B

## 2021-12-26 PROCEDURE — 6370000000 HC RX 637 (ALT 250 FOR IP): Performed by: PSYCHIATRY & NEUROLOGY

## 2021-12-26 RX ORDER — CLONAZEPAM 1 MG/1
1 TABLET ORAL 2 TIMES DAILY
Status: CANCELLED | OUTPATIENT
Start: 2021-12-26

## 2021-12-26 RX ORDER — DIPHENHYDRAMINE HCL 25 MG
25 TABLET ORAL NIGHTLY PRN
Status: CANCELLED | OUTPATIENT
Start: 2021-12-26

## 2021-12-26 RX ORDER — HALOPERIDOL 5 MG/ML
5 INJECTION INTRAMUSCULAR EVERY 6 HOURS PRN
Status: DISCONTINUED | OUTPATIENT
Start: 2021-12-26 | End: 2021-12-30 | Stop reason: HOSPADM

## 2021-12-26 RX ORDER — MECOBALAMIN 5000 MCG
10 TABLET,DISINTEGRATING ORAL NIGHTLY
Status: CANCELLED | OUTPATIENT
Start: 2021-12-26

## 2021-12-26 RX ORDER — ACETAMINOPHEN 325 MG/1
650 TABLET ORAL EVERY 4 HOURS PRN
Status: CANCELLED | OUTPATIENT
Start: 2021-12-26

## 2021-12-26 RX ORDER — HALOPERIDOL 5 MG/ML
5 INJECTION INTRAMUSCULAR EVERY 6 HOURS PRN
Status: CANCELLED | OUTPATIENT
Start: 2021-12-26

## 2021-12-26 RX ORDER — LANOLIN ALCOHOL/MO/W.PET/CERES
10 CREAM (GRAM) TOPICAL NIGHTLY
Status: ON HOLD | COMMUNITY
End: 2021-12-29 | Stop reason: HOSPADM

## 2021-12-26 RX ORDER — VITAMIN B COMPLEX
2000 TABLET ORAL DAILY
Status: DISCONTINUED | OUTPATIENT
Start: 2021-12-27 | End: 2021-12-30 | Stop reason: HOSPADM

## 2021-12-26 RX ORDER — MECOBALAMIN 5000 MCG
10 TABLET,DISINTEGRATING ORAL NIGHTLY
Status: DISCONTINUED | OUTPATIENT
Start: 2021-12-26 | End: 2021-12-30 | Stop reason: HOSPADM

## 2021-12-26 RX ORDER — IBUPROFEN 400 MG/1
400 TABLET ORAL EVERY 6 HOURS PRN
Status: DISCONTINUED | OUTPATIENT
Start: 2021-12-26 | End: 2021-12-30 | Stop reason: HOSPADM

## 2021-12-26 RX ORDER — METOPROLOL TARTRATE 50 MG/1
50 TABLET, FILM COATED ORAL 2 TIMES DAILY
Status: CANCELLED | OUTPATIENT
Start: 2021-12-26

## 2021-12-26 RX ORDER — VITAMIN B COMPLEX
2000 TABLET ORAL DAILY
Status: CANCELLED | OUTPATIENT
Start: 2021-12-26

## 2021-12-26 RX ORDER — MAGNESIUM HYDROXIDE/ALUMINUM HYDROXICE/SIMETHICONE 120; 1200; 1200 MG/30ML; MG/30ML; MG/30ML
30 SUSPENSION ORAL EVERY 6 HOURS PRN
Status: DISCONTINUED | OUTPATIENT
Start: 2021-12-26 | End: 2021-12-30 | Stop reason: HOSPADM

## 2021-12-26 RX ORDER — METOPROLOL TARTRATE 50 MG/1
50 TABLET, FILM COATED ORAL 2 TIMES DAILY
Status: DISCONTINUED | OUTPATIENT
Start: 2021-12-26 | End: 2021-12-30 | Stop reason: HOSPADM

## 2021-12-26 RX ORDER — CLONAZEPAM 1 MG/1
1 TABLET ORAL 2 TIMES DAILY
Status: DISCONTINUED | OUTPATIENT
Start: 2021-12-26 | End: 2021-12-30 | Stop reason: HOSPADM

## 2021-12-26 RX ORDER — MAGNESIUM HYDROXIDE/ALUMINUM HYDROXICE/SIMETHICONE 120; 1200; 1200 MG/30ML; MG/30ML; MG/30ML
30 SUSPENSION ORAL EVERY 6 HOURS PRN
Status: CANCELLED | OUTPATIENT
Start: 2021-12-26

## 2021-12-26 RX ORDER — IBUPROFEN 400 MG/1
400 TABLET ORAL EVERY 6 HOURS PRN
Status: CANCELLED | OUTPATIENT
Start: 2021-12-26

## 2021-12-26 RX ORDER — DIPHENHYDRAMINE HCL 25 MG
25 TABLET ORAL NIGHTLY PRN
Status: DISCONTINUED | OUTPATIENT
Start: 2021-12-26 | End: 2021-12-30 | Stop reason: HOSPADM

## 2021-12-26 RX ORDER — BENZTROPINE MESYLATE 1 MG/ML
2 INJECTION INTRAMUSCULAR; INTRAVENOUS 2 TIMES DAILY PRN
Status: DISCONTINUED | OUTPATIENT
Start: 2021-12-26 | End: 2021-12-30 | Stop reason: HOSPADM

## 2021-12-26 RX ORDER — HALOPERIDOL 5 MG
5 TABLET ORAL EVERY 6 HOURS PRN
Status: CANCELLED | OUTPATIENT
Start: 2021-12-26

## 2021-12-26 RX ORDER — BENZTROPINE MESYLATE 1 MG/ML
2 INJECTION INTRAMUSCULAR; INTRAVENOUS 2 TIMES DAILY PRN
Status: CANCELLED | OUTPATIENT
Start: 2021-12-26

## 2021-12-26 RX ORDER — HALOPERIDOL 5 MG
5 TABLET ORAL EVERY 6 HOURS PRN
Status: DISCONTINUED | OUTPATIENT
Start: 2021-12-26 | End: 2021-12-30 | Stop reason: HOSPADM

## 2021-12-26 RX ORDER — ACETAMINOPHEN 325 MG/1
650 TABLET ORAL EVERY 4 HOURS PRN
Status: DISCONTINUED | OUTPATIENT
Start: 2021-12-26 | End: 2021-12-30 | Stop reason: HOSPADM

## 2021-12-26 RX ADMIN — IBUPROFEN 400 MG: 400 TABLET, FILM COATED ORAL at 16:10

## 2021-12-26 RX ADMIN — Medication 2000 UNITS: at 08:32

## 2021-12-26 RX ADMIN — IBUPROFEN 400 MG: 400 TABLET, FILM COATED ORAL at 09:15

## 2021-12-26 RX ADMIN — METOPROLOL TARTRATE 50 MG: 50 TABLET, FILM COATED ORAL at 08:32

## 2021-12-26 RX ADMIN — CLONAZEPAM 1 MG: 1 TABLET ORAL at 08:33

## 2021-12-26 RX ADMIN — LEVOTHYROXINE SODIUM 137 MCG: 0.03 TABLET ORAL at 05:41

## 2021-12-26 RX ADMIN — METOPROLOL TARTRATE 50 MG: 50 TABLET, FILM COATED ORAL at 20:34

## 2021-12-26 RX ADMIN — Medication 10 MG: at 20:34

## 2021-12-26 RX ADMIN — CARIPRAZINE 1.5 MG: 1.5 CAPSULE, GELATIN COATED ORAL at 08:32

## 2021-12-26 RX ADMIN — MAGNESIUM HYDROXIDE/ALUMINUM HYDROXICE/SIMETHICONE 30 ML: 120; 1200; 1200 SUSPENSION ORAL at 11:59

## 2021-12-26 RX ADMIN — CLONAZEPAM 1 MG: 1 TABLET ORAL at 17:16

## 2021-12-26 ASSESSMENT — SLEEP AND FATIGUE QUESTIONNAIRES
DIFFICULTY ARISING: NO
SLEEP PATTERN: DIFFICULTY FALLING ASLEEP
RESTFUL SLEEP: NO
DIFFICULTY FALLING ASLEEP: NO
DO YOU USE A SLEEP AID: YES
DO YOU HAVE DIFFICULTY SLEEPING: YES
DIFFICULTY STAYING ASLEEP: YES

## 2021-12-26 ASSESSMENT — PAIN SCALES - GENERAL
PAINLEVEL_OUTOF10: 0
PAINLEVEL_OUTOF10: 3
PAINLEVEL_OUTOF10: 5
PAINLEVEL_OUTOF10: 0

## 2021-12-26 ASSESSMENT — LIFESTYLE VARIABLES: HISTORY_ALCOHOL_USE: NO

## 2021-12-26 NOTE — BH NOTE
4 Eyes Skin Assessment     The patient is being assessed for  Transfer to New Unit    I agree that 2 RN's have performed a thorough Head to Toe Skin Assessment on the patient. ALL assessment sites listed below have been assessed. Areas assessed for pressure by both nurses: ALL  [x]   Head, Face, and Ears   [x]   Shoulders, Back, and Chest  [x]   Arms, Elbows, and Hands   [x]   Coccyx, Sacrum, and Ischum  [x]   Legs, Feet, and Heels                                 Skin Assessed Under all Medical Devices by both nurses:  N/A              All Mepilex Borders were peeled back and area peeked at by both nurses:  No: N/A  Please list where Mepilex Borders are located:  N/A                 Does the Patient have Skin Breakdown related to pressure?   No     (Insert Photo here N/A)         Ricardo Prevention initiated:  NA   Wound Care Orders initiated:  NA      [facility] nurse consulted for Pressure Injury (Stage 3,4, Unstageable, DTI, NWPT, Complex wounds)and New or Established Ostomies:  NA        Nurse 1 eSignature: Electronically signed by Emiliano Waite RN on 12/26/21 at 12:35 PM EST     SHARE this note so that the co-signing nurse is able to place an eSignature     Nurse [facility]

## 2021-12-26 NOTE — BH NOTE
Homicidal Ideation: No    Tobacco Screening:  Practical Counseling, on admission, lisa X, if applicable and completed (first 3 are required if patient doesn't refuse):            (X)  Recognizing danger situations (included triggers and roadblocks)                    (X)  Coping skills (new ways to manage stress, exercise, relaxation techniques, changing routine, distraction)                                                           ( )  Basic information about quitting (benefits of quitting, techniques in how to quit, available resources  ( ) Referral for counseling faxed to [facility]                                           ( ) Patient refused counseling  (X) Patient has not smoked in the last 30 days    Metabolic Screening:    Lab Results   Component Value Date    LABA1C 5.2 12/22/2021       Lab Results   Component Value Date    CHOL 284 (H) 12/22/2021    CHOL 237 (H) 06/07/2017    CHOL 276 (H) 05/04/2011    CHOL 281 (H) 06/21/2010     Lab Results   Component Value Date    TRIG 186 (H) 12/22/2021    TRIG 341 (H) 06/07/2017    TRIG 237 (H) 05/04/2011    TRIG 181 (H) 06/21/2010     Lab Results   Component Value Date    HDL 29 (L) 12/22/2021    HDL 25 (L) 06/07/2017    HDL 35 (L) 05/04/2011    HDL 36 (L) 06/21/2010     No components found for: LDLCAL  Lab Results   Component Value Date    LABVLDL 37 12/22/2021    LABVLDL see below 06/07/2017    LABVLDL 47 05/04/2011    LABVLDL 36 06/21/2010         Body mass index is 25.02 kg/m². BP Readings from Last 2 Encounters:   12/26/21 (!) 136/101   12/26/21 (!) 157/103           Pt admitted with followings belongings:  Dental Appliances: None  Vision - Corrective Lenses: Glasses  Hearing Aid: None  Jewelry: None  Body Piercings Removed: N/A  Clothing: Pants,Undergarments (Comment),Shirt,Footwear,Socks  Were All Patient Medications Collected?: Not Applicable  Other Valuables:  Other (Comment) (security bag  #[id number])     Patient's home medications were N/A. Patient oriented to surroundings and program expectations and copy of patient rights given. Received admission packet:  Yes. Consents reviewed, signed Yes. Refused None. Patient verbalize understanding:  Yes. Patient education on precautions:  Yes                   Rishi Johnson RN

## 2021-12-26 NOTE — FLOWSHEET NOTE
12/26/21 1457   Encounter Summary   Services provided to: Patient   Referral/Consult From: Patient   Continue Visiting   (12/26 Epic cocnsult, sppt and prayer)   Complexity of Encounter Moderate   Length of Encounter 30 minutes   Spiritual Assessment Completed Yes   Spiritual/Amish   Type Spiritual support   Assessment Approachable;Tearful; Anxious   Intervention Active listening;Explored feelings, thoughts, concerns;Prayer;Sustaining presence/ Ministry of presence; Discussed relationship with God;Discussed belief system/Yarsanism practices/jolene   Outcome Comfort;Expressed gratitude;Engaged in conversation;Expressed feelings/needs/concerns; Shared reminiscences; Venting emotion

## 2021-12-26 NOTE — PLAN OF CARE
Patient alert and oriented x 3. Patient denies SI/HI/A/V/H. Patient refused assessment interview initial but let this writer interview her later in the shift. Patient seclusive to her bed and room. Patient took blood pressure medication but refused her Melatonin at bedtime. Patient allowed this writer to take her vital signs. No angry outbursts noted. No c/o's voiced at present.
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
## 2021-12-26 NOTE — BH NOTE
Patient is able to demonstrate the ability to move from a reclining position to an upright position within the recliner. Patient was provided with a mask to utilize on unit. Unfortunately, due to severe cognitive impairment, patient has demonstrated an inability to comply with mask use secondary to an inability to comprehend and/or consistently recall the need for consistent use. Multiple efforts to teach patient about necessity have proven ineffective, again due to severe underlying cognitive impairment. At this point in time it has become clinically apparent that efforts to force patient to utilize mask contribute only to increased behavioral disturbance. All staff will continue to utilize masks when interacting with patient.

## 2021-12-26 NOTE — FLOWSHEET NOTE
Purposeful Rounding    Patient Location: Patient room    Patient willing to engage in conversation: No    Presentation/behavior: STEVEN. Patient asleep    Affect: STEVEN.  Patient asleep    Concerns reported: None    PRN medications given: None    Environmental assessment: Room free from clutter, Clear path to bathroom  and No safety hazards noted    Fall prevention interventions in place: Yellow non-skid socks on    Daily [place] Fall Risk Score: 61    Daily Vega Fall Risk Score: Low risk      Electronically signed by Umberto Chiu RN on 12/25/21 at 9:33 PM EST

## 2021-12-27 PROBLEM — F43.10 PTSD (POST-TRAUMATIC STRESS DISORDER): Chronic | Status: ACTIVE | Noted: 2021-12-27

## 2021-12-27 PROCEDURE — 1240000000 HC EMOTIONAL WELLNESS R&B

## 2021-12-27 PROCEDURE — 99232 SBSQ HOSP IP/OBS MODERATE 35: CPT | Performed by: PSYCHIATRY & NEUROLOGY

## 2021-12-27 PROCEDURE — 6370000000 HC RX 637 (ALT 250 FOR IP): Performed by: PSYCHIATRY & NEUROLOGY

## 2021-12-27 RX ADMIN — CLONAZEPAM 1 MG: 1 TABLET ORAL at 16:08

## 2021-12-27 RX ADMIN — DIPHENHYDRAMINE HCL 25 MG: 25 TABLET ORAL at 19:30

## 2021-12-27 RX ADMIN — Medication 10 MG: at 19:30

## 2021-12-27 RX ADMIN — HALOPERIDOL 5 MG: 5 TABLET ORAL at 16:08

## 2021-12-27 RX ADMIN — METOPROLOL TARTRATE 50 MG: 50 TABLET, FILM COATED ORAL at 07:51

## 2021-12-27 RX ADMIN — IBUPROFEN 400 MG: 400 TABLET, FILM COATED ORAL at 12:48

## 2021-12-27 RX ADMIN — METOPROLOL TARTRATE 50 MG: 50 TABLET, FILM COATED ORAL at 19:31

## 2021-12-27 RX ADMIN — Medication 2000 UNITS: at 07:50

## 2021-12-27 RX ADMIN — HALOPERIDOL 5 MG: 5 TABLET ORAL at 16:01

## 2021-12-27 RX ADMIN — MAGNESIUM HYDROXIDE/ALUMINUM HYDROXICE/SIMETHICONE 30 ML: 120; 1200; 1200 SUSPENSION ORAL at 08:07

## 2021-12-27 RX ADMIN — ACETAMINOPHEN 650 MG: 325 TABLET ORAL at 17:44

## 2021-12-27 RX ADMIN — CLONAZEPAM 1 MG: 1 TABLET ORAL at 07:51

## 2021-12-27 RX ADMIN — IBUPROFEN 400 MG: 400 TABLET, FILM COATED ORAL at 17:44

## 2021-12-27 RX ADMIN — LEVOTHYROXINE SODIUM 137 MCG: 0.11 TABLET ORAL at 07:51

## 2021-12-27 RX ADMIN — CARIPRAZINE 3 MG: 3 CAPSULE, GELATIN COATED ORAL at 07:53

## 2021-12-27 ASSESSMENT — PAIN SCALES - GENERAL
PAINLEVEL_OUTOF10: 8
PAINLEVEL_OUTOF10: 2
PAINLEVEL_OUTOF10: 4
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
## 2021-12-27 NOTE — FLOWSHEET NOTE
Senior Purposeful Rounding     Position:Repositions Self     Physical Environment:Room free from clutter, Clear path to bathroom , Adequate lighting and No safety hazards noted     Pain Rating/ Nonverbal Pain Behaviors:0; denies     Pain interventions Attempted: n/a     Patient Toileted:Independent
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
## 2021-12-27 NOTE — BH NOTE
Purposeful Rounding    Patient concerns reported: Side effects of meds    Nurse made aware:Yes    Patient Turned and repositioned: Independent    Patient Toileted: Independent    Fall Precautions in Place: Room free from clutter, Clear path to bathroom , Adequate lighting and yellow non-skid sock on.       Electronically signed by Shannon Mtz on 12/26/21 at 9:04 PM EST

## 2021-12-27 NOTE — GROUP NOTE
Group Therapy Note    Date: 12/27/2021    Group Start Time: 1000  Group End Time: 1127  Group Topic: Recreational    [address]        Group Therapy Note    Attendees: 2    Group members made Arielle cards for their loved ones. Notes:  Eva Sellers made each of her grandchildren a Arielle card. Eva Sellers interacted appropriately and remained engaged     Status After Intervention:  Improved    Participation Level:  Active Listener and Interactive    Participation Quality: Appropriate and Attentive      Speech:  normal      Thought Process/Content: Logical      Affective Functioning: Congruent      Mood: euthymic      Level of consciousness:  Alert      Response to Learning: Able to verbalize current knowledge/experience      Endings: None Reported    Modes of Intervention: Activity      Discipline Responsible: Behavorial Health Tech      Signature:  GABY Schwab
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
## 2021-12-27 NOTE — BH NOTE
This clinician attempted to have [address] attend group but she said if her peer was not coming, she did not want to either.      32 Carie Eastman, ZACKARY

## 2021-12-27 NOTE — GROUP NOTE
Group Therapy Note    Date: 12/27/2021    Group Start Time: 1100  Group End Time: 1717  Group Topic: Cognitive Skills    [address] [facility],[address], [facility]        Group Therapy Note    Attendees: 4    Participants engaged in reminiscing therapy. Notes:  Pebbles Almanzar attended group and was engaged and participated throughout. Status After Intervention:  Improved    Participation Level:  Active Listener and Interactive    Participation Quality: Appropriate, Attentive and Sharing      Speech:  normal      Thought Process/Content: Logical      Affective Functioning: Congruent      Mood: euthymic      Level of consciousness:  Alert and Attentive      Response to Learning: Progressing to goal      Endings: None Reported    Modes of Intervention: Exploration      Discipline Responsible: /Counselor      Signature:  32 Carie Eastman, ZACKARY

## 2021-12-27 NOTE — PROGRESS NOTES
[facility]  Initial Interdisciplinary Treatment Plan NOTE    Review Date & Time: 12/27/2021 1005    Patient was not in treatment team    Admission Type:   Admission Type: Voluntary    Reason for admission:  Reason for Admission: delusions, manic      Estimated Length of Stay Update:  7 days  Estimated Discharge Date Update: 1/2/2022    EDUCATION:   Learner Progress Toward Treatment Goals: Reviewed group plan and strategies    Method: Group    Outcome: Verbalized understanding    PATIENT GOALS: Go Home    PLAN/TREATMENT RECOMMENDATIONS UPDATE:12/31/2021    GOALS UPDATE:   Time frame for Short-Term Goals: 12/30/2021    Sixto Owusu RN

## 2021-12-27 NOTE — FLOWSHEET NOTE
Senior Purposeful Rounding     Position:Repositions Self     Physical Environment:Room free from clutter, Clear path to bathroom , Adequate lighting and No safety hazards noted     Pain Rating/ Nonverbal Pain Behaviors:0; none observed     Pain interventions Attempted: n/a     Patient Toileted:Independent

## 2021-12-27 NOTE — DISCHARGE SUMMARY
Discharge Summary/H&P  Admit Date: 12/22/2021  Discharge Date:    12/26/2021    Final Dx: Active Hospital Problems    Diagnosis Date Noted    Bipolar 1 disorder ([address] [F31.9] 12/26/2021    Bipolar Disorder, Manic, with psychosis        Condition on DC  Patient is still intrusive and talkative. VITALS:  BP (!) 136/101   Pulse 78   Temp 98.6 °F (37 °C) (Oral)   Resp 16   Ht 5' 6\" (1.676 m)   Wt 155 lb (70.3 kg)   SpO2 97%   BMI 25.02 kg/m²   Brief Summary Present Illness/Hospital Course:   Patient was initially admitted to the general psych unit with shellie. Initially she refused medications, but she finally agreed to take Vraylar. By the time time of discharge, there had been not much change in her clinical status.       PE: (reviewed) and labs (see medical H&PE)  Labs:    Admission on 12/22/2021, Discharged on 12/26/2021   Component Date Value Ref Range Status    TSH 12/22/2021 32.52* 0.27 - 4.20 uIU/mL Final    Cholesterol, Total 12/22/2021 284* 0 - 199 mg/dL Final    Triglycerides 12/22/2021 186* 0 - 150 mg/dL Final    HDL 12/22/2021 29* 40 - 60 mg/dL Final    LDL Calculated 12/22/2021 218* <100 mg/dL Final    VLDL Cholesterol Calculated 12/22/2021 37  Not Established mg/dL Final    Hemoglobin A1C 12/22/2021 5.2  See comment % Final    Comment: Comment:  Diagnosis of Diabetes: > or = 6.5%  Increased risk of diabetes (Prediabetes): 5.7-6.4%  Glycemic Control: Nonpregnant Adults: <7.0%                    Pregnant: <6.0%        eAG 12/22/2021 102.5  mg/dL Final    Ventricular Rate 12/23/2021 75  BPM Final    Atrial Rate 12/23/2021 75  BPM Final    P-R Interval 12/23/2021 178  ms Final    QRS Duration 12/23/2021 94  ms Final    Q-T Interval 12/23/2021 408  ms Final    QTc Calculation (Bazett) 12/23/2021 455  ms Final    P Axis 12/23/2021 70  degrees Final    R Axis 12/23/2021 -32  degrees Final    T Axis 12/23/2021 38  degrees Final    Diagnosis 12/23/2021 Normal sinus rhythmLeft axis deviationNonspecific ST abnormalityAbnormal ECGWhen compared with ECG of 22-DEC-2021 00:58,No significant change was foundConfirmed by Unique Perea MD Charlotte ([id number]) on 12/23/2021 2:29:02 PM   Final        Mental Status Exam at Discharge:  Level of consciousness:  awake  Appearance:  well-appearing,good grooming and good hygiene well-developed, well-nourished  Behavior/Motor:  no abnormalities noted normal gait and station AIMS: 0  Attitude toward examiner:  Intrusive, demanding, intense and good eye contact  Speech:  Pressured speech. Mood:  Irritable  Affect:  mood congruent  Hallucinations: Absent  Thought processes:  Tangential Attention span, Concentration & Attention:  attention span and concentration were age appropriate  Thought content: Paranoid about  Neighbors. Poor insight and judgment Cognition:  oriented to person, place, and time  Fund of Knowledge: average  IQ:average Memory: mildly impaired Suicide:  No specific plan to harm self  Sleep: sleeps through the night  Appetite: ok     Reassess Carole Risk:  no specific plan to harm self Pt has phone numbers to contact if suicidal thoughts recur and states pt will return to the hospital if suicidal feelings return.    Hospital Routine Meds:     [START ON 12/27/2021] cariprazine hcl  1.5 mg Oral Daily    clonazePAM  1 mg Oral BID    influenza virus vaccine  0.5 mL IntraMUSCular Prior to discharge    [START ON 12/27/2021] levothyroxine  137 mcg Oral Daily    melatonin  10 mg Oral Nightly    metoprolol tartrate  50 mg Oral BID    [START ON 12/27/2021] Vitamin D  2,000 Units Oral Daily      Hospital PRN Meds: acetaminophen, aluminum & magnesium hydroxide-simethicone, benztropine mesylate, ibuprofen, magnesium hydroxide, sterile water, diphenhydrAMINE, haloperidol **OR** haloperidol lactate   Discharge Meds:    Current Discharge Medication List           Details   melatonin 3 MG TABS tablet Take 10 mg by mouth nightly      cariprazine hcl (VRAYLAR) 1.5 MG capsule Take 1.5 mg by mouth daily      levothyroxine (SYNTHROID) 137 MCG tablet Take 1 tablet by mouth daily  Qty: 90 tablet, Refills: 3    Associated Diagnoses: Hypothyroidism, unspecified type      Cholecalciferol (VITAMIN D) 2000 units CAPS capsule Take 1 capsule by mouth daily  Qty: 90 capsule, Refills: 3    Associated Diagnoses: Vitamin D deficiency      clonazePAM (KLONOPIN) 1 MG tablet Take 1 tablet by mouth 2 times daily Do not fill until September 28, 2017  Qty: 60 tablet, Refills: 2    Comments: Cancel the refills for the 0.5 mg dosage  Associated Diagnoses: Anxiety      metoprolol tartrate (LOPRESSOR) 50 MG tablet Take 1 tablet by mouth 2 times daily  Qty: 180 tablet, Refills: 3    Associated Diagnoses: Essential hypertension           Plan for today:  Increase Vraylar to 3 mg QD       Multiple Neuroleptics? No. Previous?  No. Clozaril --No    Disposition Transfer to [facility]

## 2021-12-27 NOTE — PLAN OF CARE
Problem: Altered Mood, Deterioration in Function:  Goal: Able to verbalize reality based thinking  Description: Able to verbalize reality based thinking  Outcome: Ongoing   James Llanes has been visible, and at the desk, multiple times this shift. She attended the day shift groups today. James Llanes did get upset when she looked out the window and saw a police car. James Llanes stated that she wants to leave the hospital AMA. James Llanes was encouraged to wait until the Dr comes to visit her today and to talk with the Dr about that. James Llanes agreed to wait and speak with Dr Maude Reyes.

## 2021-12-28 PROCEDURE — 99232 SBSQ HOSP IP/OBS MODERATE 35: CPT | Performed by: PSYCHIATRY & NEUROLOGY

## 2021-12-28 PROCEDURE — 6370000000 HC RX 637 (ALT 250 FOR IP): Performed by: PSYCHIATRY & NEUROLOGY

## 2021-12-28 PROCEDURE — 1240000000 HC EMOTIONAL WELLNESS R&B

## 2021-12-28 RX ORDER — DIPHENOXYLATE HYDROCHLORIDE AND ATROPINE SULFATE 2.5; .025 MG/1; MG/1
1 TABLET ORAL 4 TIMES DAILY PRN
Status: DISCONTINUED | OUTPATIENT
Start: 2021-12-28 | End: 2021-12-30 | Stop reason: HOSPADM

## 2021-12-28 RX ADMIN — MAGNESIUM HYDROXIDE/ALUMINUM HYDROXICE/SIMETHICONE 30 ML: 120; 1200; 1200 SUSPENSION ORAL at 04:13

## 2021-12-28 RX ADMIN — CLONAZEPAM 1 MG: 1 TABLET ORAL at 17:38

## 2021-12-28 RX ADMIN — METOPROLOL TARTRATE 50 MG: 50 TABLET, FILM COATED ORAL at 08:21

## 2021-12-28 RX ADMIN — Medication 10 MG: at 20:13

## 2021-12-28 RX ADMIN — CLONAZEPAM 1 MG: 1 TABLET ORAL at 08:21

## 2021-12-28 RX ADMIN — IBUPROFEN 400 MG: 400 TABLET, FILM COATED ORAL at 04:06

## 2021-12-28 RX ADMIN — IBUPROFEN 400 MG: 400 TABLET, FILM COATED ORAL at 13:32

## 2021-12-28 RX ADMIN — METOPROLOL TARTRATE 50 MG: 50 TABLET, FILM COATED ORAL at 20:14

## 2021-12-28 RX ADMIN — LEVOTHYROXINE SODIUM 137 MCG: 0.11 TABLET ORAL at 06:18

## 2021-12-28 RX ADMIN — IBUPROFEN 400 MG: 400 TABLET, FILM COATED ORAL at 20:22

## 2021-12-28 RX ADMIN — MAGNESIUM HYDROXIDE/ALUMINUM HYDROXICE/SIMETHICONE 30 ML: 120; 1200; 1200 SUSPENSION ORAL at 20:22

## 2021-12-28 RX ADMIN — CARIPRAZINE 3 MG: 3 CAPSULE, GELATIN COATED ORAL at 08:21

## 2021-12-28 RX ADMIN — Medication 2000 UNITS: at 08:21

## 2021-12-28 ASSESSMENT — PAIN - FUNCTIONAL ASSESSMENT: PAIN_FUNCTIONAL_ASSESSMENT: ACTIVITIES ARE NOT PREVENTED

## 2021-12-28 ASSESSMENT — PAIN DESCRIPTION - ORIENTATION: ORIENTATION: LOWER

## 2021-12-28 ASSESSMENT — PAIN DESCRIPTION - LOCATION: LOCATION: BACK

## 2021-12-28 ASSESSMENT — PAIN SCALES - GENERAL
PAINLEVEL_OUTOF10: 3
PAINLEVEL_OUTOF10: 6
PAINLEVEL_OUTOF10: 3
PAINLEVEL_OUTOF10: 0

## 2021-12-28 ASSESSMENT — PAIN DESCRIPTION - DESCRIPTORS: DESCRIPTORS: ACHING

## 2021-12-28 ASSESSMENT — PAIN DESCRIPTION - ONSET: ONSET: ON-GOING

## 2021-12-28 ASSESSMENT — PAIN DESCRIPTION - PROGRESSION: CLINICAL_PROGRESSION: NOT CHANGED

## 2021-12-28 ASSESSMENT — PAIN DESCRIPTION - PAIN TYPE: TYPE: ACUTE PAIN

## 2021-12-28 ASSESSMENT — PAIN DESCRIPTION - FREQUENCY: FREQUENCY: INTERMITTENT

## 2021-12-28 NOTE — FLOWSHEET NOTE
Senior Purposeful Rounding    Position:Ambulating in [place]    Physical Environment:Room free from clutter, Clear path to bathroom , Adequate lighting and No safety hazards noted    Pain Rating/ Nonverbal Pain Behaviors:3; pacing    Pain interventions Attempted: Ibuprofen 400mg    Patient Toileted:Independent

## 2021-12-28 NOTE — FLOWSHEET NOTE
Group Therapy Note    Date: 12/28/2021  Start Time: 1000  End Time:  2060  Number of Participants: 2    Type of Group: Music Group    Notes:  Pt present for Music Group. Pt actively participated by making song selections and singing along to music. Participation Level:  Active Listener and Interactive    Participation Quality: Appropriate and Attentive      Speech:  normal      Affective Functioning: Congruent      Endings: None Reported    Modes of Intervention: Support, Socialization, Activity and Media      Discipline Responsible:Chaplain Kaylee Cramer       12/28/21 1433   Encounter Summary   Services provided to: Patient   Continue Visiting   (12/28 Music Group)   Complexity of Encounter Moderate   Length of Encounter 45 minutes

## 2021-12-28 NOTE — PLAN OF CARE
Problem: Altered Mood, Deterioration in Function:  Goal: Ability to perform activities of daily living will improve  Description: Ability to perform activities of daily living will improve  Outcome: Ongoing  Goal: Able to verbalize reality based thinking  Description: Able to verbalize reality based thinking  Outcome: Ongoing  Goal: Maintenance of adequate nutrition will improve  Description: Maintenance of adequate nutrition will improve  Outcome: Ongoing  Goal: Ability to tolerate increased activity will improve  Description: Ability to tolerate increased activity will improve  Outcome: Ongoing    Pt is alert and oriented x3. She has been visible on the unit, up at the desk often with different requests and/or concerns. Pt is often perseverative w/ flight of ideas during conversations. Continues to be delusional. Today, pt asked staff if they found out what the fentanyl found in her system was from. Pt did not have fentanyl in her system according to her drug screen prior to admission so pt was reassured. Pt is labile, becoming upset and irritable w/ another pt often but is redirectable at this time. Exhibits bizarre behavior at times such as sitting on the ground and spinning around/dancing during a group. Medication compliant whole without difficulty. Denies SI/HI but did report that she sees dead people at times. She stated that she has not seen dead people today but has recently, and sometimes they are family members. She reports that these ghosts are not scary to her. Denies any pain at this time. Will continue to monitor.
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
## 2021-12-28 NOTE — FLOWSHEET NOTE
Senior Purposeful Rounding    Position:Repositions Self    Physical Environment:Room free from clutter, Clear path to bathroom , Adequate lighting and No safety hazards noted    Pain Rating/ Nonverbal Pain Behaviors:denies; none    Pain interventions Attempted: NA    Patient Toileted:Independent

## 2021-12-28 NOTE — DISCHARGE SUMMARY
Discharge Summary/H&P  Admit Date: 12/22/2021  Discharge Date:    12/26/2021     Final Dx: Active Hospital Problems     Diagnosis Date Noted    Bipolar 1 disorder ([address] [F31.9] 12/26/2021    Bipolar Disorder, Manic, with psychosis           Condition on DC  Patient is still intrusive and talkative.          VITALS:  BP (!) 136/101   Pulse 78   Temp 98.6 °F (37 °C) (Oral)   Resp 16   Ht 5' 6\" (1.676 m)   Wt 155 lb (70.3 kg)   SpO2 97%   BMI 25.02 kg/m²   Brief Summary Present Illness/Hospital Course:   Patient was initially admitted to the general psych unit with shellie. Initially she refused medications, but she finally agreed to take Vraylar.   By the time time of discharge, there had been not much change in her clinical status.        PE: (reviewed) and labs (see medical H&PE)  Labs:            Admission on 12/22/2021, Discharged on 12/26/2021   Component Date Value Ref Range Status    TSH 12/22/2021 32.52* 0.27 - 4.20 uIU/mL Final    Cholesterol, Total 12/22/2021 284* 0 - 199 mg/dL Final    Triglycerides 12/22/2021 186* 0 - 150 mg/dL Final    HDL 12/22/2021 29* 40 - 60 mg/dL Final    LDL Calculated 12/22/2021 218* <100 mg/dL Final    VLDL Cholesterol Calculated 12/22/2021 37  Not Established mg/dL Final    Hemoglobin A1C 12/22/2021 5.2  See comment % Final     Comment: Comment:  Diagnosis of Diabetes: > or = 6.5%  Increased risk of diabetes (Prediabetes): 5.7-6.4%  Glycemic Control: Nonpregnant Adults: <7.0%                    Pregnant: <6.0%          eAG 12/22/2021 102.5  mg/dL Final    Ventricular Rate 12/23/2021 75  BPM Final    Atrial Rate 12/23/2021 75  BPM Final    P-R Interval 12/23/2021 178  ms Final    QRS Duration 12/23/2021 94  ms Final    Q-T Interval 12/23/2021 408  ms Final    QTc Calculation (Bazett) 12/23/2021 455  ms Final    P Axis 12/23/2021 70  degrees Final    R Axis 12/23/2021 -32  degrees Final    T Axis 12/23/2021 38  degrees Final    Diagnosis 12/23/2021 Normal sinus rhythmLeft axis deviationNonspecific ST abnormalityAbnormal ECGWhen compared with ECG of 22-DEC-2021 00:58,No significant change was foundConfirmed by ALEX Chamorro MD ([id number]) on 12/23/2021 2:29:02 PM    Final        Mental Status Exam at Discharge:  Level of consciousness:  awake  Appearance:  well-appearing,good grooming and good hygiene well-developed, well-nourished  Behavior/Motor:  no abnormalities noted normal gait and station AIMS: 0  Attitude toward examiner:  Intrusive, demanding, intense and good eye contact  Speech:  Pressured speech. Mood:  Irritable  Affect:  mood congruent  Hallucinations: Absent  Thought processes:  Tangential Attention span, Concentration & Attention:  attention span and concentration were age appropriate  Thought content: Paranoid about  Neighbors. Poor insight and judgment Cognition:  oriented to person, place, and time  Fund of Knowledge: average  IQ:average Memory: mildly impaired Suicide:  No specific plan to harm self  Sleep: sleeps through the night  Appetite: ok      Reassess Carole Risk:  no specific plan to harm self Pt has phone numbers to contact if suicidal thoughts recur and states pt will return to the hospital if suicidal feelings return.    Hospital Routine Meds:    Scheduled Medications    [START ON 12/27/2021] cariprazine hcl  1.5 mg Oral Daily    clonazePAM  1 mg Oral BID    influenza virus vaccine  0.5 mL IntraMUSCular Prior to discharge    [START ON 12/27/2021] levothyroxine  137 mcg Oral Daily    melatonin  10 mg Oral Nightly    metoprolol tartrate  50 mg Oral BID    [START ON 12/27/2021] Vitamin D  2,000 Units Oral Daily         Hospital PRN Meds:   PRN Medications   acetaminophen, aluminum & magnesium hydroxide-simethicone, benztropine mesylate, ibuprofen, magnesium hydroxide, sterile water, diphenhydrAMINE, haloperidol **OR** haloperidol lactate      Discharge Meds:    Discharge Medications         Current Discharge Medication List       Details   melatonin 3 MG TABS tablet Take 10 mg by mouth nightly       cariprazine hcl (VRAYLAR) 1.5 MG capsule Take 1.5 mg by mouth daily       levothyroxine (SYNTHROID) 137 MCG tablet Take 1 tablet by mouth daily  Qty: 90 tablet, Refills: 3     Associated Diagnoses: Hypothyroidism, unspecified type       Cholecalciferol (VITAMIN D) 2000 units CAPS capsule Take 1 capsule by mouth daily  Qty: 90 capsule, Refills: 3     Associated Diagnoses: Vitamin D deficiency       clonazePAM (KLONOPIN) 1 MG tablet Take 1 tablet by mouth 2 times daily Do not fill until September 28, 2017  Qty: 60 tablet, Refills: 2     Comments: Cancel the refills for the 0.5 mg dosage  Associated Diagnoses: Anxiety       metoprolol tartrate (LOPRESSOR) 50 MG tablet Take 1 tablet by mouth 2 times daily  Qty: 180 tablet, Refills: 3     Associated Diagnoses: Essential hypertension                 Plan for today:  Increase Vraylar to 3 mg QD         Multiple Neuroleptics? No. Previous?  No. Clozaril --No     Disposition Transfer to [facility]

## 2021-12-28 NOTE — FLOWSHEET NOTE
Senior Purposeful Rounding    Position:Repositions Self    Physical Environment:Room free from clutter, Clear path to bathroom , Adequate lighting and No safety hazards noted    Pain Rating/ Nonverbal Pain Behaviors:0; none    Pain interventions Attempted: NA    Patient Toileted:Independent

## 2021-12-28 NOTE — GROUP NOTE
Group Therapy Note    Date: 12/28/2021    Group Start Time: 1100  Group End Time: 4735  Group Topic: Recreational    [id number]        Group Therapy Note    Attendees: 2    Group members engaged in a game of guessing what show a theme song belonged to from the [de-identified]. While playing, members reminisced on that time of their lives. Notes:  Malik Masters attended group for the full duration. Malik Masters interacted appropriately with the other member of the group. 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 Status After Intervention:  Improved    Participation Level:  Active Listener    Participation Quality: Appropriate      Speech:  normal      Thought Process/Content: Flight of ideas      Affective Functioning: Congruent      Mood: Engaged     Level of consciousness:  Alert      Response to Learning: Able to verbalize current knowledge/experience      Endings: None Reported    Modes of Intervention: Activity      Discipline Responsible: Behavorial Health Tech      Signature:  GABY Ruvalcaba

## 2021-12-28 NOTE — PROGRESS NOTES
Behavioral Services  Medicare Certification Upon Admission    I certify that this patient's inpatient psychiatric hospital admission is medically necessary for:    [x] (1) Treatment which could reasonably be expected to improve this patient's condition,       [x] (2) Or for diagnostic study;     AND     [x](2) The inpatient psychiatric services are provided while the individual is under the care of a physician and are included in the individualized plan of care.     Estimated length of stay/service 5    Plan for post-hospital care [facility], [facility]    Electronically signed by Lolita Boyd MD on 12/28/2021 at 1:11 PM

## 2021-12-29 PROCEDURE — 99232 SBSQ HOSP IP/OBS MODERATE 35: CPT | Performed by: PSYCHIATRY & NEUROLOGY

## 2021-12-29 PROCEDURE — G0008 ADMIN INFLUENZA VIRUS VAC: HCPCS | Performed by: PSYCHIATRY & NEUROLOGY

## 2021-12-29 PROCEDURE — 90686 IIV4 VACC NO PRSV 0.5 ML IM: CPT | Performed by: PSYCHIATRY & NEUROLOGY

## 2021-12-29 PROCEDURE — 1240000000 HC EMOTIONAL WELLNESS R&B

## 2021-12-29 PROCEDURE — 6360000002 HC RX W HCPCS: Performed by: PSYCHIATRY & NEUROLOGY

## 2021-12-29 PROCEDURE — 6370000000 HC RX 637 (ALT 250 FOR IP): Performed by: PSYCHIATRY & NEUROLOGY

## 2021-12-29 RX ORDER — CLONAZEPAM 1 MG/1
1 TABLET ORAL 2 TIMES DAILY
Qty: 42 TABLET | Refills: 0 | Status: SHIPPED | OUTPATIENT
Start: 2021-12-29 | End: 2022-01-19

## 2021-12-29 RX ORDER — MECOBALAMIN 5000 MCG
10 TABLET,DISINTEGRATING ORAL NIGHTLY
Qty: 60 TABLET | Refills: 1 | Status: SHIPPED | OUTPATIENT
Start: 2021-12-29 | End: 2022-02-09

## 2021-12-29 RX ADMIN — CARIPRAZINE 3 MG: 3 CAPSULE, GELATIN COATED ORAL at 08:51

## 2021-12-29 RX ADMIN — LEVOTHYROXINE SODIUM 137 MCG: 0.11 TABLET ORAL at 06:50

## 2021-12-29 RX ADMIN — ACETAMINOPHEN 650 MG: 325 TABLET ORAL at 17:11

## 2021-12-29 RX ADMIN — INFLUENZA A VIRUS A/VICTORIA/2570/2019 IVR-215 (H1N1) ANTIGEN (PROPIOLACTONE INACTIVATED), INFLUENZA A VIRUS A/CAMBODIA/E0826360/2020 IVR-224 (H3N2) ANTIGEN (PROPIOLACTONE INACTIVATED), INFLUENZA B VIRUS B/VICTORIA/705/2018 BVR-11 ANTIGEN (PROPIOLACTONE INACTIVATED), INFLUENZA B VIRUS B/PHUKET/3073/2013 BVR-1B ANTIGEN (PROPIOLACTONE INACTIVATED) 0.5 ML: 15; 15; 15; 15 INJECTION, SUSPENSION INTRAMUSCULAR at 17:09

## 2021-12-29 RX ADMIN — MAGNESIUM HYDROXIDE/ALUMINUM HYDROXICE/SIMETHICONE 30 ML: 120; 1200; 1200 SUSPENSION ORAL at 20:36

## 2021-12-29 RX ADMIN — Medication 10 MG: at 19:59

## 2021-12-29 RX ADMIN — METOPROLOL TARTRATE 50 MG: 50 TABLET, FILM COATED ORAL at 08:51

## 2021-12-29 RX ADMIN — DIPHENHYDRAMINE HCL 25 MG: 25 TABLET ORAL at 02:19

## 2021-12-29 RX ADMIN — METOPROLOL TARTRATE 50 MG: 50 TABLET, FILM COATED ORAL at 19:59

## 2021-12-29 RX ADMIN — IBUPROFEN 400 MG: 400 TABLET, FILM COATED ORAL at 13:43

## 2021-12-29 RX ADMIN — CLONAZEPAM 1 MG: 1 TABLET ORAL at 17:05

## 2021-12-29 RX ADMIN — CLONAZEPAM 1 MG: 1 TABLET ORAL at 08:51

## 2021-12-29 RX ADMIN — Medication 2000 UNITS: at 08:51

## 2021-12-29 RX ADMIN — DIPHENHYDRAMINE HCL 25 MG: 25 TABLET ORAL at 21:30

## 2021-12-29 RX ADMIN — DIPHENOXYLATE HYDROCHLORIDE AND ATROPINE SULFATE 1 TABLET: 2.5; .025 TABLET ORAL at 17:05

## 2021-12-29 ASSESSMENT — PAIN DESCRIPTION - PAIN TYPE: TYPE: ACUTE PAIN

## 2021-12-29 ASSESSMENT — PAIN SCALES - GENERAL
PAINLEVEL_OUTOF10: 5
PAINLEVEL_OUTOF10: 0
PAINLEVEL_OUTOF10: 5
PAINLEVEL_OUTOF10: 0

## 2021-12-29 ASSESSMENT — PAIN DESCRIPTION - PROGRESSION: CLINICAL_PROGRESSION: NOT CHANGED

## 2021-12-29 ASSESSMENT — PAIN - FUNCTIONAL ASSESSMENT: PAIN_FUNCTIONAL_ASSESSMENT: ACTIVITIES ARE NOT PREVENTED

## 2021-12-29 ASSESSMENT — PAIN DESCRIPTION - DESCRIPTORS: DESCRIPTORS: ACHING

## 2021-12-29 ASSESSMENT — PAIN DESCRIPTION - ORIENTATION: ORIENTATION: UPPER

## 2021-12-29 ASSESSMENT — PAIN DESCRIPTION - FREQUENCY: FREQUENCY: INTERMITTENT

## 2021-12-29 ASSESSMENT — PAIN DESCRIPTION - LOCATION: LOCATION: BACK

## 2021-12-29 ASSESSMENT — PAIN DESCRIPTION - ONSET: ONSET: PROGRESSIVE

## 2021-12-29 NOTE — BH NOTE
Purposeful Rounding    Patient concerns reported:NONE NOTED.  PT IN ROOM RESTING    Nurse made aware:NO    Patient Turned and repositioned: Independent    Patient Toileted: Independent    Fall Precautions in Place: Yellow non-skid socks on, Bed locked in low position, 1/2 bed rails up, Lighting appropriate, Room free of clutter and Clear path to bathroom      Electronically signed by Noelle Abarca on 12/28/21 at 7:50 PM EST

## 2021-12-29 NOTE — PROGRESS NOTES
Department of Psychiatry  AttendingProgress Note    Chief Complaint: Margarita    The treatment team met and discussed the treatment plan. Sleeping well. Paranoia present, but milder. Occasionally irritable with other patients. She is glad that she will be discharged tomorrow. SUBJECTIVE:    Patient is feeling better. Suicidal ideation:  denies suicidal ideation. Patient does not have medication side effects. ROS: Patient has new complaints: no  Sleeping adequately:  Yes   Appetite adequate: Yes  Attending groups: Yes  Visitors:No    OBJECTIVE    Physical  VITALS:  /71   Pulse 86   Temp 97.2 °F (36.2 °C) (Temporal)   Resp 16   Ht 5' 6\" (1.676 m)   Wt 155 lb (70.3 kg)   SpO2 97%   BMI 25.02 kg/m²     Mental Status Examination:  Patients appearance was well-appearing, street clothes, good grooming and good hygiene. Thoughts are Goal directed and Logical. Homicidal ideations none. No abnormal movements, tics or mannerisms. Memory intact Aims 0. Concentration Good. Alert and oriented X 4. Insight and Judgement impaired insight. Patient was cooperative. Patient gait normal. Mood anxious, affect anxiety Hallucinations Absent, suicidal ideations no specific plan to harm self Speech normal pitch and normal volume  Data  Labs:   No visits with results within 2 Day(s) from this visit.    Latest known visit with results is:   Admission on 12/22/2021, Discharged on 12/26/2021   Component Date Value Ref Range Status    TSH 12/22/2021 32.52* 0.27 - 4.20 uIU/mL Final    Cholesterol, Total 12/22/2021 284* 0 - 199 mg/dL Final    Triglycerides 12/22/2021 186* 0 - 150 mg/dL Final    HDL 12/22/2021 29* 40 - 60 mg/dL Final    LDL Calculated 12/22/2021 218* <100 mg/dL Final    VLDL Cholesterol Calculated 12/22/2021 37  Not Established mg/dL Final    Hemoglobin A1C 12/22/2021 5.2  See comment % Final    Comment: Comment:  Diagnosis of Diabetes: > or = 6.5%  Increased risk of diabetes (Prediabetes): 5.7-6.4%  Glycemic Control: Nonpregnant Adults: <7.0%                    Pregnant: <6.0%        eAG 12/22/2021 102.5  mg/dL Final    Ventricular Rate 12/23/2021 75  BPM Final    Atrial Rate 12/23/2021 75  BPM Final    P-R Interval 12/23/2021 178  ms Final    QRS Duration 12/23/2021 94  ms Final    Q-T Interval 12/23/2021 408  ms Final    QTc Calculation (Bazett) 12/23/2021 455  ms Final    P Axis 12/23/2021 70  degrees Final    R Axis 12/23/2021 -32  degrees Final    T Axis 12/23/2021 38  degrees Final    Diagnosis 12/23/2021 Normal sinus rhythmLeft axis deviationNonspecific ST abnormalityAbnormal ECGWhen compared with ECG of 22-DEC-2021 00:58,No significant change was foundConfirmed by Jose Cruz MD ALEX ([id number]) on 12/23/2021 2:29:02 PM   Final            Medications  Current Facility-Administered Medications: diphenoxylate-atropine (LOMOTIL) 2.5-0.025 MG per tablet 1 tablet, 1 tablet, Oral, 4x Daily PRN  acetaminophen (TYLENOL) tablet 650 mg, 650 mg, Oral, Q4H PRN  aluminum & magnesium hydroxide-simethicone (MAALOX) 200-200-20 MG/5ML suspension 30 mL, 30 mL, Oral, Q6H PRN  benztropine mesylate (COGENTIN) injection 2 mg, 2 mg, IntraMUSCular, BID PRN  ibuprofen (ADVIL;MOTRIN) tablet 400 mg, 400 mg, Oral, Q6H PRN  magnesium hydroxide (MILK OF MAGNESIA) 400 MG/5ML suspension 30 mL, 30 mL, Oral, Daily PRN  sterile water injection 2.1 mL, 2.1 mL, IntraMUSCular, Q4H PRN  clonazePAM (KLONOPIN) tablet 1 mg, 1 mg, Oral, BID  diphenhydrAMINE (BENADRYL) tablet 25 mg, 25 mg, Oral, Nightly PRN  haloperidol (HALDOL) tablet 5 mg, 5 mg, Oral, Q6H PRN **OR** haloperidol lactate (HALDOL) injection 5 mg, 5 mg, IntraMUSCular, Q6H PRN  influenza quadrivalent split vaccine (FLUZONE;FLUARIX;FLULAVAL;AFLURIA) injection 0.5 mL, 0.5 mL, IntraMUSCular, Prior to discharge  levothyroxine (SYNTHROID) tablet 137 mcg, 137 mcg, Oral, Daily  melatonin disintegrating tablet 10 mg, 10 mg, Oral, Nightly  metoprolol tartrate (LOPRESSOR) tablet 50 mg, 50 mg, Oral, BID  Vitamin D (CHOLECALCIFEROL) tablet 2,000 Units, 2,000 Units, Oral, Daily  cariprazine hcl (VRAYLAR) capsule 3 mg, 3 mg, Oral, Daily    ASSESSMENT AND PLAN    Principal Problem:    Bipolar disorder, current episode manic severe with psychotic features (HCC)  Active Problems:    AFSHIN (generalized anxiety disorder)    Hypothyroidism due to acquired atrophy of thyroid    Chronic prescription benzodiazepine use    Marijuana use    PTSD (post-traumatic stress disorder)  Resolved Problems:    * No resolved hospital problems. *       1. Patient s symptoms   are improving  2. Probable discharge is tomorrow  3. Discharge planning is incomplete  4. Continue Vraylar 3 mg daily and Melatoin 10 mg QHS.

## 2021-12-29 NOTE — GROUP NOTE
Group Therapy Note    Date: 12/29/2021    Group Start Time: 1100  Group End Time: 9589  Group Topic: [address] [facility], [facility]        Group Therapy Note    Group members engagement in structured socialization/reminiscence in music therapy session, using pt-preferred music stimuli as determinant of conversation topics. Group processed leisure/hobbies throughout stages of life through adolescence, adulthood, parenthood, and elder years. Attendees: 2         Notes: Kadie Malin was present and appropriately engaged across discussions. Status After Intervention:  Improved    Participation Level:  Active Listener and Interactive    Participation Quality: Appropriate and Sharing      Speech:  normal      Thought Process/Content: Logical      Affective Functioning: Congruent      Mood: euthymic      Level of consciousness:  Alert and Attentive      Response to Learning: Capable of insight and Progressing to goal      Endings: None Reported    Modes of Intervention: Education, Socialization, Exploration, Clarifying, Activity and Media      Discipline Responsible: Psychoeducational Specialist      Signature:  Jase Stafford, MM, MT-BC

## 2021-12-29 NOTE — FLOWSHEET NOTE
Senior Purposeful Rounding    Position:Sitting    Physical Environment:Room free from clutter, Clear path to bathroom , Adequate lighting and No safety hazards noted    Pain Rating/ Nonverbal Pain Behaviors:denies; none    Pain interventions Attempted: NA    Patient Toileted:Independent

## 2021-12-29 NOTE — GROUP NOTE
Group Therapy Note    Date: 12/29/2021    Group Start Time: 0100  Group End Time: 0649  Group Topic: Recreational    [address]        Group Therapy Note    Attendees: 3    Members of the group were asked to build with Kin[employer] and engaged in various conversations. Notes:  Kiran Bran attended group for the full duration. Kiran Bran remained engaged with the lauren and interacted appropriately with others in the group. Status After Intervention:  Improved    Participation Level:  Active Listener    Participation Quality: Appropriate      Speech:  normal      Thought Process/Content: Logical      Affective Functioning: Congruent      Mood: euthymic      Level of consciousness:  Alert      Response to Learning: Able to verbalize current knowledge/experience      Endings: None Reported    Modes of Intervention: Activity      Discipline Responsible: Behavorial Health Tech      Signature:  GABY Villavicencio

## 2021-12-29 NOTE — FLOWSHEET NOTE
Senior Purposeful Rounding     Position:Repositions Self     Physical Environment:Room free from clutter, Clear path to bathroom , Adequate lighting and No safety hazards noted     Pain Rating/ Nonverbal Pain Behaviors: back pain     Pain interventions Attempted: prn ibuprofen     Patient Toileted:Independent

## 2021-12-29 NOTE — FLOWSHEET NOTE
Senior Purposeful Rounding    Position:Sitting and Repositions Self    Physical Environment:Room free from clutter, Clear path to bathroom , Adequate lighting and No safety hazards noted    Pain Rating/ Nonverbal Pain Behaviors:denies; 0    Pain interventions Attempted: none    Patient Toileted:Continent and Independent

## 2021-12-29 NOTE — PLAN OF CARE
Problem: Altered Mood, Deterioration in Function:  Goal: Ability to perform activities of daily living will improve  Description: Ability to perform activities of daily living will improve  12/28/2021 2248 by Demian Little RN  Outcome: Ongoing     Problem: Altered Mood, Deterioration in Function:  Goal: Able to verbalize reality based thinking  Description: Able to verbalize reality based thinking  12/28/2021 2248 by Demian Little RN  Outcome: Ongoing    Pt has been resting in her room for most of the shift. She was medication compliant. She continues to tell of delusional stories. Prn ibuprofen given for back pain at 2022. Prn maalox also given for indigestion. Both prns were effective. She was given snacks and drinks. Pt did complain of dizziness while in bed. Encouraged pt to keep feet elevated and to get up slowly.  Denies needs currently

## 2021-12-29 NOTE — BH NOTE
Purposeful Rounding    Patient concerns reported:NONE NOTED.  PT IN BED SLEEPING    Nurse made aware:NO    Patient Turned and repositioned: Independent    Patient Toileted: Independent    Fall Precautions in Place: Yellow non-skid socks on, Bed locked in low position, 1/2 bed rails up, Lighting appropriate, Room free of clutter and Clear path to bathroom      Electronically signed by Julien Sin on 12/29/21 at 5:38 AM EST

## 2021-12-29 NOTE — BH NOTE
Purposeful Rounding    Patient concerns reported:NONE NOTED.  PT IN BED SLEEPING    Nurse made aware:NO    Patient Turned and repositioned: Independent    Patient Toileted: Independent    Fall Precautions in Place: Yellow non-skid socks on, Bed locked in low position, 1/2 bed rails up, Lighting appropriate, Room free of clutter and Clear path to bathroom      Electronically signed by Charmaine Rivera on 12/28/21 at 9:46 PM EST

## 2021-12-29 NOTE — FLOWSHEET NOTE
Senior Purposeful Rounding     Position:Repositions Self     Physical Environment:Room free from clutter, Clear path to bathroom , Adequate lighting and No safety hazards noted     Pain Rating/ Nonverbal Pain Behaviors: none observed     Pain interventions Attempted: n/a     Patient Toileted:Independent

## 2021-12-29 NOTE — BH NOTE
[address] [facility]  Day 3 Interdisciplinary Treatment Plan NOTE    Review Date & Time: 12/29/2021 1000    Patient was not in treatment team    Estimated Length of Stay Update:  1-2 days  Estimated Discharge Date Update: 12/30/21-12/31/21    EDUCATION:   Learner Progress Toward Treatment Goals: Reviewed goals and plan of care    Method: Individual    Outcome: Verbalized understanding    PATIENT GOALS: none    PLAN/TREATMENT RECOMMENDATIONS UPDATE: continue treatment    GOALS UPDATE:   Time frame for Short-Term Goals: n/a      Poncho Renteria RN

## 2021-12-29 NOTE — FLOWSHEET NOTE
Senior Purposeful Rounding    Position:Ambulating in [place]    Physical Environment:Room free from clutter, Clear path to bathroom , Adequate lighting and No safety hazards noted    Pain Rating/ Nonverbal Pain Behaviors:denies; 0    Pain interventions Attempted: none    Patient Toileted:Continent and Independent

## 2021-12-29 NOTE — BH NOTE
Purposeful Rounding    Patient concerns reported:NONE NOTED.  PT IN BED SLEEPING    Nurse made aware:NO    Patient Turned and repositioned: Independent    Patient Toileted: Independent    Fall Precautions in Place: Yellow non-skid socks on, Bed locked in low position, 1/2 bed rails up, Lighting appropriate, Room free of clutter and Clear path to bathroom      Electronically signed by Brenton Villasenor on 12/28/21 at 11:40 PM EST

## 2021-12-29 NOTE — FLOWSHEET NOTE
Senior Purposeful Rounding    Position:Sitting and Repositions Self    Physical Environment:Room free from clutter, Clear path to bathroom , Adequate lighting and No safety hazards noted    Pain Rating/ Nonverbal Pain Behaviors: 5/10; 0    Pain interventions Attempted: PRN Ibuprofen    Patient Toileted:Continent and Independent

## 2021-12-29 NOTE — BH NOTE
Met with pt to discuss dc planning. She stated she plans to return home to her apartment and does feel safe going there. She does not have an out-pt psychiatrist, but she does feel like it would be beneficial to have one. She would prefer a female psychiatrist. She gave verbal consent to contact her sister Luis Gilmore to inform her of expected dc tomorrow and discuss dc planning. Spoke with pt's sister Luis Gilmore regarding pt's progress, treatment plan, and expected dc tomorrow. She will be available to pick pt up around 12:30 tomorrow.

## 2021-12-29 NOTE — BH NOTE
Purposeful Rounding    Patient concerns reported:PT IS TALKING TO NURSE NOW IN DAYROOM ABOUT ISSUES    Nurse made aware:YES    Patient Turned and repositioned: Independent    Patient Toileted: Independent    Fall Precautions in Place: Yellow non-skid socks on, Lighting appropriate, Room free of clutter and Clear path to bathroom      Electronically signed by Judie Ennis on 12/29/21 at 3:53 AM EST

## 2021-12-29 NOTE — PLAN OF CARE
Problem: Altered Mood, Deterioration in Function:  Goal: Ability to perform activities of daily living will improve  Description: Ability to perform activities of daily living will improve  Outcome: Ongoing     Problem: Altered Mood, Deterioration in Function:  Goal: Able to verbalize reality based thinking  Description: Able to verbalize reality based thinking  Outcome: Ongoing     Problem: Falls - Risk of:  Goal: Will remain free from falls  Description: Will remain free from falls  Outcome: Ongoing  Pt has remained free from falls and injury so far this shift. Med compliant. Denies SI/HI, AVH. No RTIS noted. Pt visible and social on the unit. Participated in groups. Intake adequate. Independent with ambulation and ADLs. Delusions softening. In group at this time. Nonskid footwear on. Will continue to monitor.

## 2021-12-29 NOTE — GROUP NOTE
Group Therapy Note    Date: 12/29/2021    Group Start Time: 1000  Group End Time: 1756  Group Topic: Recreational    [address] [facility], [place]        Group Therapy Note    Attendees: Participants created paintings using water color paint as a means of practicing a coping skill. Notes:  Kiran Bran attended group and was engaged and participated in the group activity.      Status After Intervention:  Improved    Participation Level: Interactive    Participation Quality: Appropriate, Attentive and Sharing      Speech:  normal      Thought Process/Content: Logical      Affective Functioning: Congruent      Mood: euthymic      Level of consciousness:  Alert and Attentive      Response to Learning: Progressing to goal      Endings: None Reported    Modes of Intervention: Socialization and Activity      Discipline Responsible: /Counselor      Signature:  [address]man, [place]

## 2021-12-30 VITALS
TEMPERATURE: 97.3 F | SYSTOLIC BLOOD PRESSURE: 128 MMHG | DIASTOLIC BLOOD PRESSURE: 92 MMHG | BODY MASS INDEX: 24.91 KG/M2 | RESPIRATION RATE: 16 BRPM | HEART RATE: 84 BPM | HEIGHT: 66 IN | OXYGEN SATURATION: 97 % | WEIGHT: 155 LBS

## 2021-12-30 PROCEDURE — 99239 HOSP IP/OBS DSCHRG MGMT >30: CPT | Performed by: PSYCHIATRY & NEUROLOGY

## 2021-12-30 PROCEDURE — 6370000000 HC RX 637 (ALT 250 FOR IP): Performed by: PSYCHIATRY & NEUROLOGY

## 2021-12-30 PROCEDURE — 5130000000 HC BRIDGE APPOINTMENT

## 2021-12-30 RX ORDER — ARIPIPRAZOLE 5 MG/1
5 TABLET ORAL DAILY
Qty: 7 TABLET | Refills: 0 | Status: SHIPPED | OUTPATIENT
Start: 2021-12-30

## 2021-12-30 RX ADMIN — DIPHENOXYLATE HYDROCHLORIDE AND ATROPINE SULFATE 1 TABLET: 2.5; .025 TABLET ORAL at 06:12

## 2021-12-30 RX ADMIN — CARIPRAZINE 3 MG: 3 CAPSULE, GELATIN COATED ORAL at 08:07

## 2021-12-30 RX ADMIN — METOPROLOL TARTRATE 50 MG: 50 TABLET, FILM COATED ORAL at 08:05

## 2021-12-30 RX ADMIN — Medication 2000 UNITS: at 08:05

## 2021-12-30 RX ADMIN — CLONAZEPAM 1 MG: 1 TABLET ORAL at 08:05

## 2021-12-30 RX ADMIN — IBUPROFEN 400 MG: 400 TABLET, FILM COATED ORAL at 09:06

## 2021-12-30 RX ADMIN — LEVOTHYROXINE SODIUM 137 MCG: 0.11 TABLET ORAL at 06:06

## 2021-12-30 ASSESSMENT — PAIN SCALES - GENERAL
PAINLEVEL_OUTOF10: 0
PAINLEVEL_OUTOF10: 4
PAINLEVEL_OUTOF10: 0
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
## 2021-12-30 NOTE — PLAN OF CARE
Pt A+Ox4, anxious about discharge tomorrow, medication compliant, denies SI/HI/AVH and no RTIS noted. She was visible on the unit and pleasant. She decided to go to be early and denies any other needs at this time.

## 2021-12-30 NOTE — PROGRESS NOTES
Behavioral Services  Medicare Certification Upon Admission     I certify that this patient's inpatient psychiatric hospital admission is medically necessary for:    [x]? (1) Treatment which could reasonably be expected to improve this patient's condition,        [x]? (2) Or for diagnostic study;      AND      [x]?(2) The inpatient psychiatric services are provided while the individual is under the care of a physician and are included in the individualized plan of care.     Estimated length of stay/service  1 day     Plan for post-hospital care [address]

## 2021-12-30 NOTE — PROGRESS NOTES
[facility]  Discharge Note    Pt discharged with followings belongings:   Dental Appliances: None  Vision - Corrective Lenses: Glasses  Hearing Aid: None  Jewelry: None  Body Piercings Removed: N/A  Clothing: Pants,Undergarments (Comment),Shirt,Footwear,Socks  Were All Patient Medications Collected?: Not Applicable  Other Valuables: Other (Comment) (security bag  #[id number])   Valuables sent home with patient.  Patient education on aftercare instructions: yes  Information faxed to  11:43 AM .Patient verbalize understanding of AVS:  yes    Status EXAM upon discharge:  Status and Exam  Normal: No  Facial Expression: Worried  Affect: Congruent  Level of Consciousness: Alert  Mood:Normal: No  Mood: Anxious,Labile  Motor Activity:Normal: Yes  Motor Activity: Increased  Interview Behavior: Cooperative  Preception: [place] to Person,[place] to Time,[place] to Place  Attention:Normal: No  Attention: Distractible  Thought Processes: Perseveration  Thought Content:Normal: No  Thought Content: Delusions,Paranoia  Hallucinations: None  Delusions: Yes  Delusions: Persecution  Memory:Normal: No  Memory: Poor Recent  Insight and Judgment: No  Insight and Judgment: Poor Judgment,Poor Insight  Present Suicidal Ideation: No  Present Homicidal Ideation: No      Metabolic Screening:    Lab Results   Component Value Date    LABA1C 5.2 12/22/2021       Lab Results   Component Value Date    CHOL 284 (H) 12/22/2021    CHOL 237 (H) 06/07/2017    CHOL 276 (H) 05/04/2011    CHOL 281 (H) 06/21/2010     Lab Results   Component Value Date    TRIG 186 (H) 12/22/2021    TRIG 341 (H) 06/07/2017    TRIG 237 (H) 05/04/2011    TRIG 181 (H) 06/21/2010     Lab Results   Component Value Date    HDL 29 (L) 12/22/2021    HDL 25 (L) 06/07/2017    HDL 35 (L) 05/04/2011    HDL 36 (L) 06/21/2010     No components found for: [facility] [place]  Lab Results   Component Value Date    LABVLDL 37 12/22/2021    LABVLDL see below 06/07/2017    LABVLDL 47 05/04/2011 LABVLDL 36 06/21/2010     Bridge Appointment completed: Reviewed Discharge Instructions with patient. Patient verbalizes understanding and agreement with the discharge plan using the teachback method.      Referral for Outpatient Tobacco Cessation Counseling, upon discharge (lisa X if applicable and completed):    ( )  Hospital staff assisted patient to call Quit Line or faxed referral                                   during hospitalization                  ( )  Recognizing danger situations (included triggers and roadblocks), if not completed on admission                    ( )  Coping skills (new ways to manage stress, exercise, relaxation techniques, changing routine, distraction), if not completed on admission                                                           ( )  Basic information about quitting (benefits of quitting, techniques in how to quit, available resources, if not completed on admission  ( ) Referral for counseling faxed to [facility]   ( ) Patient refused referral  ( ) Patient refused counseling  (x ) Patient refused smoking cessation medication upon discharge    Vaccinations (lisa X if applicable and completed):  ( ) Patient states already received influenza vaccine elsewhere  (x ) Patient received influenza vaccine during this hospitalization  ( ) Patient refused influenza vaccine at this time    Mario Enrique RN

## 2021-12-30 NOTE — BH NOTE
Purposeful Rounding    Patient concerns reported: None.  Patient resting in bed    Nurse made aware: N/A    Patient Turned and repositioned: Independent    Patient Toileted: Independent    Fall Precautions in Place:  Yellow non-skid socks on, Lighting appropriate, Room free of clutter and Clear path to bathroom      Electronically signed by Christie Wilkinson on 12/29/21 at 9:23 PM EST